# Patient Record
Sex: MALE | Race: WHITE | Employment: OTHER | URBAN - METROPOLITAN AREA
[De-identification: names, ages, dates, MRNs, and addresses within clinical notes are randomized per-mention and may not be internally consistent; named-entity substitution may affect disease eponyms.]

---

## 2018-08-20 ENCOUNTER — OFFICE VISIT (OUTPATIENT)
Dept: CARDIOLOGY CLINIC | Age: 83
End: 2018-08-20
Payer: MEDICARE

## 2018-08-20 VITALS
HEART RATE: 77 BPM | BODY MASS INDEX: 24.67 KG/M2 | RESPIRATION RATE: 18 BRPM | WEIGHT: 157.2 LBS | SYSTOLIC BLOOD PRESSURE: 134 MMHG | DIASTOLIC BLOOD PRESSURE: 68 MMHG | HEIGHT: 67 IN

## 2018-08-20 DIAGNOSIS — I10 ESSENTIAL HYPERTENSION: ICD-10-CM

## 2018-08-20 DIAGNOSIS — I34.0 NON-RHEUMATIC MITRAL REGURGITATION: ICD-10-CM

## 2018-08-20 DIAGNOSIS — I45.6 WPW (WOLFF-PARKINSON-WHITE SYNDROME): Primary | ICD-10-CM

## 2018-08-20 DIAGNOSIS — I36.1 NON-RHEUMATIC TRICUSPID VALVE INSUFFICIENCY: ICD-10-CM

## 2018-08-20 PROCEDURE — 93000 ELECTROCARDIOGRAM COMPLETE: CPT | Performed by: INTERNAL MEDICINE

## 2018-08-20 PROCEDURE — 1101F PT FALLS ASSESS-DOCD LE1/YR: CPT | Performed by: INTERNAL MEDICINE

## 2018-08-20 PROCEDURE — G8420 CALC BMI NORM PARAMETERS: HCPCS | Performed by: INTERNAL MEDICINE

## 2018-08-20 PROCEDURE — 4040F PNEUMOC VAC/ADMIN/RCVD: CPT | Performed by: INTERNAL MEDICINE

## 2018-08-20 PROCEDURE — G8598 ASA/ANTIPLAT THER USED: HCPCS | Performed by: INTERNAL MEDICINE

## 2018-08-20 PROCEDURE — 99213 OFFICE O/P EST LOW 20 MIN: CPT | Performed by: INTERNAL MEDICINE

## 2018-08-20 PROCEDURE — G8427 DOCREV CUR MEDS BY ELIG CLIN: HCPCS | Performed by: INTERNAL MEDICINE

## 2018-08-20 PROCEDURE — 1036F TOBACCO NON-USER: CPT | Performed by: INTERNAL MEDICINE

## 2018-08-20 PROCEDURE — 1123F ACP DISCUSS/DSCN MKR DOCD: CPT | Performed by: INTERNAL MEDICINE

## 2018-08-20 RX ORDER — ACETAMINOPHEN 325 MG/1
650 TABLET ORAL EVERY 6 HOURS PRN
COMMUNITY
End: 2019-07-11 | Stop reason: CLARIF

## 2018-08-20 RX ORDER — MECLIZINE HYDROCHLORIDE 25 MG/1
TABLET ORAL
COMMUNITY
Start: 2018-06-25 | End: 2019-04-09

## 2018-08-20 NOTE — PROGRESS NOTES
Patient Active Problem List   Diagnosis    WPW (Donato-Parkinson-White syndrome) - s/p ablation    Cro[facility]'s disease ([facility] [address]    Mitral regurgitation - mild     Tricuspid regurgitation - moderate     Hypertension    Carotid stenosis - mild     Chronic pain of left knee       Current Outpatient Prescriptions   Medication Sig Dispense Refill    meclizine (ANTIVERT) 25 MG tablet       acetaminophen (TYLENOL) 325 MG tablet Take 650 mg by mouth every 6 hours as needed for Pain      Omega-3 Fatty Acids (FISH OIL OMEGA-3 PO) Take by mouth      atorvastatin (LIPITOR) 20 MG tablet Take 20 mg by mouth daily      aspirin 81 MG tablet Take 81 mg by mouth daily.  Calcium Citrate-Vitamin D 200-200 MG-UNIT TABS Take 1,200 mg by mouth daily. Pill also has Vit D 800 Units Pt takes two pills a day      Lutein 40 MG CAPS Take 40 mg by mouth       Multiple Vitamins-Minerals (VISION-MELIDA PRESERVE PO) Take  by mouth.  Coenzyme Q10 100 MG CAPS Take 200 mg by mouth daily.  amlodipine (NORVASC) 5 MG tablet Take 5 mg by mouth daily.  mesalamine (PENTASA) 250 MG CR capsule Take 250 mg by mouth Takes 6 tab three time daily      Multiple Vitamin (MULTIVITAMIN PO) Take  by mouth daily.  Bilberry 1000 MG CAPS Take 2,000 mg by mouth daily        No current facility-administered medications for this visit. CC:    Patient is seen in follow up for:  1. WPW (Donato-Parkinson-White syndrome) - s/p ablation    2. Essential hypertension    3. Non-rheumatic tricuspid valve insufficiency    4. Non-rheumatic mitral regurgitation        HPI:  Patient is doing well without any specific cardiac problems. Patient denies any shortness of breath, chest pain, lightheadedness or dizziness. Patient is tolerating medications well without side effects. Walks on treadmill at 3 miles per hour several days a week without any problems.   Wants to consider decreasing statin therapy as he has concerns about possible cognitive effects. ROS:   General: No unusual weight gain, no change in exercise tolerance  Cardiovascular: No orthopnea or paroxysmal nocturnal dyspnea  Respiratory: No cough or hemoptysis      Social History     Social History    Marital status:      Spouse name: N/A    Number of children: N/A    Years of education: N/A     Occupational History    Not on file. Social History Main Topics    Smoking status: Never Smoker    Smokeless tobacco: Never Used    Alcohol use No    Drug use: No    Sexual activity: Not on file     Other Topics Concern    Not on file     Social History Narrative    No narrative on file       Past Medical History:   Diagnosis Date    Carotid artery occlusion     Chronic pain of left knee     Crohn disease ([facility] [address]     Hypertension     Mitral regurgitation     Osteopenia     Osteoporosis     Tricuspid regurgitation     WPW (Donato-Parkinson-White syndrome)     had ablation to correct       PHYSICAL EXAM:  CONSTITUTIONAL:  Well developed, well nourished    Vitals:    08/20/18 0826   BP: 134/68   Pulse: 77   Resp: 18   Weight: 157 lb 3.2 oz (71.3 kg)   Height: 5' 7\" (1.702 m)     HEAD & FACE: Normocephalic. Symmetric. EYES: No xanthelasma. Conjunctivae not injected. EARS, NOSE, MOUTH & THROAT: Good dentition. No oral pallor or cyanosis. NECK: No JVD at 30 degrees. No thyromegaly. RESPIRATORY: Clear to auscultation and percussion in all fields. No use of accessory muscle or intercostal retractions. CARDIOVASCULAR: Regular rate and rhythm. No lifts or thrills on palpitation. Auscultation with normal S1-S2 in intensity and splitting. No carotid bruits. Abdominal aorta not enlarged. Femoral arteries without bruits. Pedal pulses 2+. No edema. ABDOMEN: Soft without hepatic or splenic enlargement. No tenderness. MUSCULOSKELETAL: No kyphosis or scoliosis of the back. Good muscle strength and tone. No muscle atrophy.   Normal gait and ability to undergo exercise stress testing. EXTREMITIES: No clubbing or cyanosis. SKIN: No Xanthomas or ulcerations. NEUROLOGIC: Oriented to time, place and person. Normal mood and affect. LYMPHATIC:  No palpable neck or supraclavicular nodes. No splenomegaly. EKG: Normal sinus rhythm. Short MI No change compared to prior tracing. ASSESSMENT:                                                     ORDERS:       Diagnosis Orders   1. WPW (Donato-Parkinson-White syndrome) - s/p ablation  EKG 12 Lead   2. Essential hypertension  EKG 12 Lead   3. Non-rheumatic tricuspid valve insufficiency  EKG 12 Lead   4. Non-rheumatic mitral regurgitation  EKG 12 Lead     Above assessment cardiac issues stable. PLAN:   See above orders. Reviewed prior records and testing. Assessment of medication compliance. Relates recent carotid ultrasound with only mild plaque by [facility]. Reasonable to decrese statin therapy in view of age / LDL 49. Discussed issues that would prompt earlier evaluation. Follow-up office visit in 1 year.

## 2019-04-08 ENCOUNTER — APPOINTMENT (OUTPATIENT)
Dept: GENERAL RADIOLOGY | Age: 84
End: 2019-04-08
Payer: MEDICARE

## 2019-04-08 ENCOUNTER — HOSPITAL ENCOUNTER (OUTPATIENT)
Age: 84
Setting detail: OBSERVATION
Discharge: HOME | End: 2019-04-09
Attending: EMERGENCY MEDICINE | Admitting: INTERNAL MEDICINE
Payer: MEDICARE

## 2019-04-08 DIAGNOSIS — R06.00 DYSPNEA, UNSPECIFIED TYPE: Primary | ICD-10-CM

## 2019-04-08 PROBLEM — R07.9 CHEST PAIN: Status: ACTIVE | Noted: 2019-04-08

## 2019-04-08 PROBLEM — I10 ESSENTIAL HYPERTENSION: Chronic | Status: ACTIVE | Noted: 2019-04-08

## 2019-04-08 LAB
ALBUMIN SERPL-MCNC: 4.4 G/DL (ref 3.5–5.2)
ALP BLD-CCNC: 122 U/L (ref 40–129)
ALT SERPL-CCNC: 26 U/L (ref 0–40)
ANION GAP SERPL CALCULATED.3IONS-SCNC: 9 MMOL/L (ref 7–16)
AST SERPL-CCNC: 31 U/L (ref 0–39)
BASOPHILS ABSOLUTE: 0.07 E9/L (ref 0–0.2)
BASOPHILS RELATIVE PERCENT: 0.9 % (ref 0–2)
BILIRUB SERPL-MCNC: 0.5 MG/DL (ref 0–1.2)
BUN BLDV-MCNC: 16 MG/DL (ref 8–23)
CALCIUM SERPL-MCNC: 9.7 MG/DL (ref 8.6–10.2)
CHLORIDE BLD-SCNC: 104 MMOL/L (ref 98–107)
CO2: 28 MMOL/L (ref 22–29)
CREAT SERPL-MCNC: 0.8 MG/DL (ref 0.7–1.2)
EOSINOPHILS ABSOLUTE: 0.15 E9/L (ref 0.05–0.5)
EOSINOPHILS RELATIVE PERCENT: 1.9 % (ref 0–6)
GFR AFRICAN AMERICAN: >60
GFR NON-AFRICAN AMERICAN: >60 ML/MIN/1.73
GLUCOSE BLD-MCNC: 166 MG/DL (ref 74–99)
HCT VFR BLD CALC: 45.5 % (ref 37–54)
HEMOGLOBIN: 14.9 G/DL (ref 12.5–16.5)
IMMATURE GRANULOCYTES #: 0.01 E9/L
IMMATURE GRANULOCYTES %: 0.1 % (ref 0–5)
LYMPHOCYTES ABSOLUTE: 1.95 E9/L (ref 1.5–4)
LYMPHOCYTES RELATIVE PERCENT: 24.5 % (ref 20–42)
MAGNESIUM: 2.1 MG/DL (ref 1.6–2.6)
MCH RBC QN AUTO: 33 PG (ref 26–35)
MCHC RBC AUTO-ENTMCNC: 32.7 % (ref 32–34.5)
MCV RBC AUTO: 100.7 FL (ref 80–99.9)
MONOCYTES ABSOLUTE: 1.04 E9/L (ref 0.1–0.95)
MONOCYTES RELATIVE PERCENT: 13.1 % (ref 2–12)
NEUTROPHILS ABSOLUTE: 4.73 E9/L (ref 1.8–7.3)
NEUTROPHILS RELATIVE PERCENT: 59.5 % (ref 43–80)
PDW BLD-RTO: 13.6 FL (ref 11.5–15)
PLATELET # BLD: 193 E9/L (ref 130–450)
PMV BLD AUTO: 9.7 FL (ref 7–12)
POTASSIUM SERPL-SCNC: 4.1 MMOL/L (ref 3.5–5)
PRO-BNP: 95 PG/ML (ref 0–450)
RBC # BLD: 4.52 E12/L (ref 3.8–5.8)
SODIUM BLD-SCNC: 141 MMOL/L (ref 132–146)
TOTAL PROTEIN: 7.7 G/DL (ref 6.4–8.3)
TROPONIN: <0.01 NG/ML (ref 0–0.03)
WBC # BLD: 8 E9/L (ref 4.5–11.5)

## 2019-04-08 PROCEDURE — 93005 ELECTROCARDIOGRAM TRACING: CPT | Performed by: NURSE PRACTITIONER

## 2019-04-08 PROCEDURE — 71046 X-RAY EXAM CHEST 2 VIEWS: CPT

## 2019-04-08 PROCEDURE — 36415 COLL VENOUS BLD VENIPUNCTURE: CPT

## 2019-04-08 PROCEDURE — 99285 EMERGENCY DEPT VISIT HI MDM: CPT

## 2019-04-08 PROCEDURE — G0378 HOSPITAL OBSERVATION PER HR: HCPCS

## 2019-04-08 PROCEDURE — 83735 ASSAY OF MAGNESIUM: CPT

## 2019-04-08 PROCEDURE — 84484 ASSAY OF TROPONIN QUANT: CPT

## 2019-04-08 PROCEDURE — 85025 COMPLETE CBC W/AUTO DIFF WBC: CPT

## 2019-04-08 PROCEDURE — 80053 COMPREHEN METABOLIC PANEL: CPT

## 2019-04-08 PROCEDURE — 83880 ASSAY OF NATRIURETIC PEPTIDE: CPT

## 2019-04-08 NOTE — ED NOTES
FIRST PROVIDER CONTACT ASSESSMENT NOTE      Department of Emergency Medicine   4/8/19  7:03 PM    Chief Complaint: Sinusitis (sinus symptoms and shortness of breath for a couple weeks.)      History of Present Illness:    Shanelle Spicer is a 80 y.o. male who presents to the ED by private car for increasing shortness of breath over the last 2 weeks. Focused Screening Exam:  Constitutional:  Alert, appears stated age and is in no distress.         *ALLERGIES*     Sumycin [tetracycline hcl] and Tetracyclines & related     ED Triage Vitals   BP Temp Temp src Pulse Resp SpO2 Height Weight   04/08/19 1901 04/08/19 1901 -- 04/08/19 1855 -- 04/08/19 1855 04/08/19 1901 04/08/19 1901   (!) 150/106 98.6 °F (37 °C)  108  96 % 5' 7\" (1.702 m) 160 lb (72.6 kg)        Initial Plan of Care:  Initiate Treatment-Testing, Proceed toTreatment Area When Bed Available for ED Attending/MLP to Continue Care    -----------------END OF FIRST PROVIDER CONTACT ASSESSMENT NOTE--------------  Electronically signed by KIANA Salguero CNP   DD: 4/8/19         KIANA Salguero CNP  04/08/19 1903

## 2019-04-09 ENCOUNTER — APPOINTMENT (OUTPATIENT)
Dept: NUCLEAR MEDICINE | Age: 84
End: 2019-04-09
Payer: MEDICARE

## 2019-04-09 VITALS
HEART RATE: 78 BPM | HEIGHT: 67 IN | DIASTOLIC BLOOD PRESSURE: 52 MMHG | OXYGEN SATURATION: 98 % | BODY MASS INDEX: 25.11 KG/M2 | SYSTOLIC BLOOD PRESSURE: 130 MMHG | TEMPERATURE: 98 F | RESPIRATION RATE: 16 BRPM | WEIGHT: 160 LBS

## 2019-04-09 LAB
CHOLESTEROL, TOTAL: 134 MG/DL (ref 0–199)
EKG ATRIAL RATE: 95 BPM
EKG P AXIS: 44 DEGREES
EKG P-R INTERVAL: 134 MS
EKG Q-T INTERVAL: 346 MS
EKG QRS DURATION: 80 MS
EKG QTC CALCULATION (BAZETT): 434 MS
EKG R AXIS: -13 DEGREES
EKG T AXIS: 87 DEGREES
EKG VENTRICULAR RATE: 95 BPM
HCT VFR BLD CALC: 48.4 % (ref 37–54)
HDLC SERPL-MCNC: 66 MG/DL
HEMOGLOBIN: 16 G/DL (ref 12.5–16.5)
LDL CHOLESTEROL CALCULATED: 49 MG/DL (ref 0–99)
LV EF: 71 %
LVEF MODALITY: NORMAL
MCH RBC QN AUTO: 33 PG (ref 26–35)
MCHC RBC AUTO-ENTMCNC: 33.1 % (ref 32–34.5)
MCV RBC AUTO: 99.8 FL (ref 80–99.9)
PDW BLD-RTO: 13.6 FL (ref 11.5–15)
PLATELET # BLD: 198 E9/L (ref 130–450)
PMV BLD AUTO: 10.1 FL (ref 7–12)
RBC # BLD: 4.85 E12/L (ref 3.8–5.8)
TRIGL SERPL-MCNC: 94 MG/DL (ref 0–149)
TROPONIN: <0.01 NG/ML (ref 0–0.03)
TROPONIN: <0.01 NG/ML (ref 0–0.03)
VLDLC SERPL CALC-MCNC: 19 MG/DL
WBC # BLD: 6.5 E9/L (ref 4.5–11.5)

## 2019-04-09 PROCEDURE — 78452 HT MUSCLE IMAGE SPECT MULT: CPT

## 2019-04-09 PROCEDURE — G0378 HOSPITAL OBSERVATION PER HR: HCPCS

## 2019-04-09 PROCEDURE — 85027 COMPLETE CBC AUTOMATED: CPT

## 2019-04-09 PROCEDURE — 2580000003 HC RX 258: Performed by: INTERNAL MEDICINE

## 2019-04-09 PROCEDURE — 3430000000 HC RX DIAGNOSTIC RADIOPHARMACEUTICAL: Performed by: RADIOLOGY

## 2019-04-09 PROCEDURE — 80061 LIPID PANEL: CPT

## 2019-04-09 PROCEDURE — A9500 TC99M SESTAMIBI: HCPCS | Performed by: RADIOLOGY

## 2019-04-09 PROCEDURE — 84484 ASSAY OF TROPONIN QUANT: CPT

## 2019-04-09 PROCEDURE — 93017 CV STRESS TEST TRACING ONLY: CPT

## 2019-04-09 PROCEDURE — 6370000000 HC RX 637 (ALT 250 FOR IP): Performed by: INTERNAL MEDICINE

## 2019-04-09 PROCEDURE — 36415 COLL VENOUS BLD VENIPUNCTURE: CPT

## 2019-04-09 PROCEDURE — 6360000002 HC RX W HCPCS: Performed by: INTERNAL MEDICINE

## 2019-04-09 RX ORDER — AMLODIPINE BESYLATE 5 MG/1
5 TABLET ORAL DAILY
Status: DISCONTINUED | OUTPATIENT
Start: 2019-04-09 | End: 2019-04-10 | Stop reason: HOSPADM

## 2019-04-09 RX ORDER — ASPIRIN 81 MG/1
81 TABLET, CHEWABLE ORAL DAILY
Status: DISCONTINUED | OUTPATIENT
Start: 2019-04-09 | End: 2019-04-09 | Stop reason: SDUPTHER

## 2019-04-09 RX ORDER — ASPIRIN 81 MG/1
81 TABLET, CHEWABLE ORAL DAILY
Status: DISCONTINUED | OUTPATIENT
Start: 2019-04-09 | End: 2019-04-10 | Stop reason: HOSPADM

## 2019-04-09 RX ORDER — SODIUM CHLORIDE 0.9 % (FLUSH) 0.9 %
10 SYRINGE (ML) INJECTION PRN
Status: DISCONTINUED | OUTPATIENT
Start: 2019-04-09 | End: 2019-04-10 | Stop reason: HOSPADM

## 2019-04-09 RX ORDER — MULTIVIT-MIN/IRON/FOLIC ACID/K 18-600-40
1 CAPSULE ORAL DAILY
COMMUNITY

## 2019-04-09 RX ORDER — ACETAMINOPHEN 325 MG/1
650 TABLET ORAL EVERY 4 HOURS PRN
Status: DISCONTINUED | OUTPATIENT
Start: 2019-04-09 | End: 2019-04-10 | Stop reason: HOSPADM

## 2019-04-09 RX ORDER — ATORVASTATIN CALCIUM 10 MG/1
20 TABLET, FILM COATED ORAL DAILY
Status: DISCONTINUED | OUTPATIENT
Start: 2019-04-09 | End: 2019-04-09 | Stop reason: SDUPTHER

## 2019-04-09 RX ORDER — ONDANSETRON 2 MG/ML
4 INJECTION INTRAMUSCULAR; INTRAVENOUS EVERY 6 HOURS PRN
Status: DISCONTINUED | OUTPATIENT
Start: 2019-04-09 | End: 2019-04-10 | Stop reason: HOSPADM

## 2019-04-09 RX ORDER — ATORVASTATIN CALCIUM 40 MG/1
40 TABLET, FILM COATED ORAL NIGHTLY
Status: DISCONTINUED | OUTPATIENT
Start: 2019-04-09 | End: 2019-04-10 | Stop reason: HOSPADM

## 2019-04-09 RX ORDER — SODIUM CHLORIDE 0.9 % (FLUSH) 0.9 %
10 SYRINGE (ML) INJECTION EVERY 12 HOURS SCHEDULED
Status: DISCONTINUED | OUTPATIENT
Start: 2019-04-09 | End: 2019-04-10 | Stop reason: HOSPADM

## 2019-04-09 RX ADMIN — Medication 10 ML: at 10:02

## 2019-04-09 RX ADMIN — MESALAMINE 500 MG: 250 CAPSULE ORAL at 18:17

## 2019-04-09 RX ADMIN — ACETAMINOPHEN 325 MG: 325 TABLET, FILM COATED ORAL at 10:08

## 2019-04-09 RX ADMIN — ASPIRIN 81 MG 81 MG: 81 TABLET ORAL at 10:01

## 2019-04-09 RX ADMIN — Medication 9 MILLICURIE: at 12:33

## 2019-04-09 RX ADMIN — Medication 33.4 MILLICURIE: at 14:15

## 2019-04-09 RX ADMIN — REGADENOSON 0.4 MG: 0.08 INJECTION, SOLUTION INTRAVENOUS at 14:15

## 2019-04-09 RX ADMIN — MESALAMINE 500 MG: 250 CAPSULE ORAL at 10:02

## 2019-04-09 ASSESSMENT — PAIN SCALES - GENERAL
PAINLEVEL_OUTOF10: 0

## 2019-04-09 NOTE — H&P
[id number] [facility]  Attending History and Physical      CHIEF COMPLAINT:  Chest pain      HISTORY OF PRESENT ILLNESS:      The patient is a 80 y.o. male patient of dr Rosa M Ambrosio who presents with complains of chest pain. Patient states he was doing yard work and developed shortness of breath. He was dragging a 15 foot broken pine tree into the woods. His symptoms went away after a few moments of rest.  He denied chest pain, abdominal pain, nausea, vomiting, fevers, chills and diaphoresis. He is back to his usual state of health. Past Medical History:    Past Medical History:   Diagnosis Date    Carotid artery occlusion     Chronic pain of left knee     Crohn disease (HCC)     Hypertension     Mitral regurgitation     Osteopenia     Osteoporosis     Tricuspid regurgitation     WPW (Donato-Parkinson-White syndrome)     had ablation to correct       Past Surgical History:    Past Surgical History:   Procedure Laterality Date    ABLATION OF DYSRHYTHMIC FOCUS  2009    wpw    COLECTOMY      x 3  Due to Crohns Disease  Age 52, Age 46 and 5   [facility] INGUINAL HERNIA REPAIR Right     Undecended testicle       Medications Prior to Admission:    Medications Prior to Admission: Cholecalciferol (VITAMIN D) 2000 units CAPS capsule, Take by mouth  acetaminophen (TYLENOL) 325 MG tablet, Take 650 mg by mouth every 6 hours as needed for Pain  Omega-3 Fatty Acids (FISH OIL OMEGA-3 PO), Take by mouth  atorvastatin (LIPITOR) 20 MG tablet, Take 20 mg by mouth daily  aspirin 81 MG tablet, Take 81 mg by mouth daily. Calcium Citrate-Vitamin D 200-200 MG-UNIT TABS, Take 1,200 mg by mouth daily. Pill also has Vit D 800 Units Pt takes two pills a day  Lutein 40 MG CAPS, Take 40 mg by mouth   Multiple Vitamins-Minerals (VISION-MELIDA PRESERVE PO), Take  by mouth. Coenzyme Q10 100 MG CAPS, Take 200 mg by mouth daily. amlodipine (NORVASC) 5 MG tablet, Take 5 mg by mouth daily.     mesalamine (PENTASA) 250 MG CR capsule, Take 250 mg by mouth Takes 6 tab three time daily  Multiple Vitamin (MULTIVITAMIN PO), Take  by mouth daily. Bilberry 1000 MG CAPS, Take 2,000 mg by mouth daily     Allergies:    Sumycin [tetracycline hcl] and Tetracyclines & related    Social History:    reports that he has never smoked. He has never used smokeless tobacco. He reports that he does not drink alcohol or use drugs. Family History:   family history includes Cancer in his father; Heart Disease in his mother. REVIEW OF SYSTEMS:  As above in the HPI, otherwise negative    PHYSICAL EXAM:    Vitals:  BP (!) 147/73   Pulse 78   Temp 97.8 °F (36.6 °C) (Oral)   Resp 18   Ht 5' 7\" (1.702 m)   Wt 160 lb (72.6 kg)   SpO2 99%   BMI 25.06 kg/m²     General:  Awake, alert, oriented X 3. Well developed, well nourished, well groomed. No apparent distress. HEENT:  Normocephalic, atraumatic. Pupils equal, round, reactive to light. No scleral icterus. No conjunctival injection. Normal lips, teeth, and gums. No nasal discharge. Neck:  Supple  Heart:  RRR, no murmurs, gallops, rubs  Lungs:  CTA bilaterally, bilat symmetrical expansion, no wheeze, rales, or rhonchi  Abdomen:   Bowel sounds present, soft, nontender, no masses, no organomegaly, no peritoneal signs  Extremities:  No clubbing, cyanosis, or edema  Skin:  Warm and dry, no open lesions or rash  Neuro:  Cranial nerves 2-12 intact, no focal deficits  Breast: deferred  Rectal: deferred  Genitalia:  deferred    LABS:    CBC with Differential:    Lab Results   Component Value Date    WBC 6.5 04/09/2019    RBC 4.85 04/09/2019    HGB 16.0 04/09/2019    HCT 48.4 04/09/2019     04/09/2019    MCV 99.8 04/09/2019    MCH 33.0 04/09/2019    MCHC 33.1 04/09/2019    RDW 13.6 04/09/2019    SEGSPCT 75 01/19/2014    LYMPHOPCT 24.5 04/08/2019    MONOPCT 13.1 04/08/2019    BASOPCT 0.9 04/08/2019    MONOSABS 1.04 04/08/2019    LYMPHSABS 1.95 04/08/2019    EOSABS 0.15 04/08/2019    JADENBS 0.07 04/08/2019     CMP:    Lab Results   Component Value Date     04/08/2019    K 4.1 04/08/2019     04/08/2019    CO2 28 04/08/2019    BUN 16 04/08/2019    CREATININE 0.8 04/08/2019    GFRAA >60 04/08/2019    LABGLOM >60 04/08/2019    GLUCOSE 166 04/08/2019    PROT 7.7 04/08/2019    LABALBU 4.4 04/08/2019    CALCIUM 9.7 04/08/2019    BILITOT 0.5 04/08/2019    ALKPHOS 122 04/08/2019    AST 31 04/08/2019    ALT 26 04/08/2019     BMP:    Lab Results   Component Value Date     04/08/2019    K 4.1 04/08/2019     04/08/2019    CO2 28 04/08/2019    BUN 16 04/08/2019    LABALBU 4.4 04/08/2019    CREATININE 0.8 04/08/2019    CALCIUM 9.7 04/08/2019    GFRAA >60 04/08/2019    LABGLOM >60 04/08/2019    GLUCOSE 166 04/08/2019     Magnesium:    Lab Results   Component Value Date    MG 2.1 04/08/2019     Phosphorus:  No results found for: PHOS  PT/INR:    Lab Results   Component Value Date    PROTIME 11.8 01/19/2014    INR 1.1 01/19/2014     PTT:  No results found for: APTT, PTT[APTT}  Troponin:    Lab Results   Component Value Date    TROPONINI <0.01 04/09/2019     Last 3 Troponin:    Lab Results   Component Value Date    TROPONINI <0.01 04/09/2019    TROPONINI <0.01 04/09/2019    TROPONINI <0.01 04/08/2019     U/A:  No results found for: NITRITE, COLORU, PROTEINU, PHUR, LABCAST, WBCUA, RBCUA, MUCUS, TRICHOMONAS, YEAST, BACTERIA, CLARITYU, SPECGRAV, LEUKOCYTESUR, UROBILINOGEN, BILIRUBINUR, BLOODU, GLUCOSEU, AMORPHOUS  HgBA1c:  No results found for: LABA1C  FLP:    Lab Results   Component Value Date    TRIG 94 04/09/2019    HDL 66 04/09/2019    LDLCALC 49 04/09/2019    LABVLDL 19 04/09/2019     TSH:  No results found for: TSH    ASSESSMENT:      Patient Active Problem List   Diagnosis    WPW (Donato-Parkinson-White syndrome) - s/p ablation    Crohn's disease ([address]    Mitral regurgitation - mild     Tricuspid regurgitation - moderate     Essential hypertension    Chest pain PLAN:    Stable. Stable. Stable. Stable. Blood pressure ok, continue current medications  Rule out ACS. EKG and enzymes negative. Atypical symptoms. Will stress. Discharge if stress is negative.     Lizbeth Arias MD  11:12 AM  4/9/2019

## 2019-04-09 NOTE — ED PROVIDER NOTES
Department of Emergency Medicine   ED  Provider Note  Admit Date/RoomTime: 4/8/2019  9:34 PM  ED Room: 14A/14A-14      History of Present Illness:  4/8/19, Time: 9:30 PM         Michael Bill is a 80 y.o. male presenting to the ED for shortness of breath, beginning a few weeks ago. The complaint has been persistent, moderate in severity, and worsened by nothing. Pt reports that he was working outside in his yard and started feeling more short of breath today. States his complaint is worsened with exertion. Dr. Lulla Hodgkins is his cardiologist. Hx of ablation 10 years ago. Pt denies any loc, palpitations, fever, chills, nausea, vomiting, diarrhea, abdominal pain, neck pain, extremity pain, edema, HA, cough, congestion, visual disturbances, dysuria, constipation, hematuria, weakness, numbness, tingling, dizziness or any other further symptoms at this time. Review of Systems:   Pertinent positives and negatives are stated within HPI, all other systems reviewed and are negative.    --------------------------------------------- PAST HISTORY ------------------------------------------  Past Medical History:  has a past medical history of Carotid artery occlusion, Chronic pain of left knee, Crohn disease ([address] Hypertension, Mitral regurgitation, Osteopenia, Osteoporosis, Tricuspid regurgitation, and WPW (Donato-Parkinson-White syndrome). Past Surgical History:  has a past surgical history that includes ablation of dysrhythmic focus (2009); Inguinal hernia repair (Right); and colectomy. Social History:  reports that he has never smoked. He has never used smokeless tobacco. He reports that he does not drink alcohol or use drugs. Family History: family history includes Cancer in his father; Heart Disease in his mother. The patients home medications have been reviewed.     Allergies: Sumycin [tetracycline hcl] and Tetracyclines & related    ----------------------------------------------PHYSICAL EXAM--------------------------------------  Constitutional/General: Alert and oriented x3, well appearing, non toxic in NAD  Head: Normocephalic and atraumatic  Eyes: PERRL, EOMI, conjunctiva normal, sclera non icteric  Mouth: Oropharynx clear, handling secretions, no trismus   Neck: Supple, no JVD, full ROM  Respiratory: Lungs clear to auscultation bilaterally, no wheezes, rales, or rhonchi. Not in respiratory distress  Cardiovascular:  Regular rate. Regular rhythm. No gallops, or rubs. 2+ distal pulses  Chest: No chest wall tenderness  GI:  Abdomen Soft, Non tender, Non distended. +BS. No rebound, guarding, or rigidity. No pulsatile masses. Musculoskeletal: Moves all extremities x 4. Warm and well perfused, no clubbing, cyanosis, or edema. Integument: Skin warm and dry. No rashes. Neurologic: GCS 15, CN II-XII grossly intact, no focal deficits, symmetric strength 5/5 in the upper and lower extremities bilaterally  Psychiatric: Normal Affect    -------------------------------------------------- RESULTS -------------------------------------------------  I have personally reviewed all laboratory and imaging results for this patient. Results are listed below.      LABS:  Results for orders placed or performed during the hospital encounter of 04/08/19   Troponin   Result Value Ref Range    Troponin <0.01 0.00 - 0.03 ng/mL   CBC Auto Differential   Result Value Ref Range    WBC 8.0 4.5 - 11.5 E9/L    RBC 4.52 3.80 - 5.80 E12/L    Hemoglobin 14.9 12.5 - 16.5 g/dL    Hematocrit 45.5 37.0 - 54.0 %    .7 (H) 80.0 - 99.9 fL    MCH 33.0 26.0 - 35.0 pg    MCHC 32.7 32.0 - 34.5 %    RDW 13.6 11.5 - 15.0 fL    Platelets 562 749 - 321 E9/L    MPV 9.7 7.0 - 12.0 fL    Neutrophils % 59.5 43.0 - 80.0 %    Immature Granulocytes % 0.1 0.0 - 5.0 %    Lymphocytes % 24.5 20.0 - 42.0 %    Monocytes % 13.1 (H) 2.0 - 12.0 %    Eosinophils % 1.9 0.0 - 6.0 %    Basophils % 0.9 0.0 - 2.0 %    Neutrophils # 4.73 1.80 - 7.30 E9/L Immature Granulocytes # 0.01 E9/L    Lymphocytes # 1.95 1.50 - 4.00 E9/L    Monocytes # 1.04 (H) 0.10 - 0.95 E9/L    Eosinophils # 0.15 0.05 - 0.50 E9/L    Basophils # 0.07 0.00 - 0.20 E9/L   Comprehensive Metabolic Panel   Result Value Ref Range    Sodium 141 132 - 146 mmol/L    Potassium 4.1 3.5 - 5.0 mmol/L    Chloride 104 98 - 107 mmol/L    CO2 28 22 - 29 mmol/L    Anion Gap 9 7 - 16 mmol/L    Glucose 166 (H) 74 - 99 mg/dL    BUN 16 8 - 23 mg/dL    CREATININE 0.8 0.7 - 1.2 mg/dL    GFR Non-African American >60 >=60 mL/min/1.73    GFR African American >60     Calcium 9.7 8.6 - 10.2 mg/dL    Total Protein 7.7 6.4 - 8.3 g/dL    Alb 4.4 3.5 - 5.2 g/dL    Total Bilirubin 0.5 0.0 - 1.2 mg/dL    Alkaline Phosphatase 122 40 - 129 U/L    ALT 26 0 - 40 U/L    AST 31 0 - 39 U/L   Magnesium   Result Value Ref Range    Magnesium 2.1 1.6 - 2.6 mg/dL   Brain Natriuretic Peptide   Result Value Ref Range    Pro-BNP 95 0 - 450 pg/mL   EKG 12 Lead   Result Value Ref Range    Ventricular Rate 95 BPM    Atrial Rate 95 BPM    P-R Interval 134 ms    QRS Duration 80 ms    Q-T Interval 346 ms    QTc Calculation (Bazett) 434 ms    P Axis 44 degrees    R Axis -13 degrees    T Axis 87 degrees       RADIOLOGY:  Interpreted by Radiologist.  XR CHEST STANDARD (2 VW)   Final Result   Grossly unchanged CXR exam with no obvious new acute cardiopulmonary   pathology. EKG: This EKG is signed and interpreted by the EP. Time: 19:31  Rate: 95  Rhythm: Sinus  Interpretation: non-specific EKG  Comparison: no previous EKG available    ------------------------- NURSING NOTES AND VITALS REVIEWED ---------------------------   The nursing notes within the ED encounter and vital signs as below have been reviewed by myself.   BP (!) 150/106   Pulse 102   Temp 98.6 °F (37 °C)   Ht 5' 7\" (1.702 m)   Wt 160 lb (72.6 kg)   SpO2 98%   BMI 25.06 kg/m²   Oxygen Saturation Interpretation: Normal    The patients available past medical records and past encounters were reviewed. ------------------------- ED COURSE/MEDICAL DECISION MAKING----------------------  Medications - No data to display    Medical Decision Making:    Pt presents for shortness of breath. Labs and imaging obtained, reviewed; results discussed with patient. Pt to be admitted. This patient's ED course included: a personal history and physicial examination    This patient has remained hemodynamically stable during their ED course. Re-Evaluations:           Re-evaluation. Patients symptoms show no change       Consultations:    Spoke with Dr. Oley Sacks (Medicine). Discussed case. They will admit this patient. Counseling: The emergency provider has spoken with the patient and discussed todays results, in addition to providing specific details for the plan of care and counseling regarding the diagnosis and prognosis. Questions are answered at this time and they are agreeable with the plan.     ---------------------------- IMPRESSION AND DISPOSITION ---------------------------------    IMPRESSION  No diagnosis found. DISPOSITION  Disposition: Admit to telemetry  Patient condition is stable    SCRIBE ATTESTATION  4/8/19, 9:30 PM.    This note is prepared by [facility], acting as Scribe for Sylvia Huitron MD.    Sylvia Huitron MD: The scribe's documentation has been prepared under my direction and personally reviewed by me in its entirety. I confirm that the note above accurately reflects all work, treatment, procedures, and medical decision making performed by me. NOTE: This report was transcribed using voice recognition software. Every effort was made to ensure accuracy; however, inadvertent computerized transcription errors may be present.        Sylvia Huitron MD  04/09/19 [id number]

## 2019-04-09 NOTE — PROCEDURES
[facility] Nuclear Stress Test:    Cardiologist: Dr. Rafi Preutt    Date: 5/16/2018    Indications for study: Chest pain    Baseline EKG: NSR, occasional PVCs, septal MI  Age undetermined, abnormal EKG. 1. No chest pain  2. No new arrhythmias  3. No EKG changes suggestive of stress induced ischemia  4. Nuclear images pending    Myriam Eastman MD., [facility].    [facility]) Cardiology

## 2019-04-09 NOTE — PLAN OF CARE
Problem: Pain:  Goal: Pain level will decrease  Description  Pain level will decrease  Outcome: Completed  Goal: Control of acute pain  Description  Control of acute pain  Outcome: Completed  Goal: Control of chronic pain  Description  Control of chronic pain  Outcome: Completed

## 2019-05-13 ENCOUNTER — HOSPITAL ENCOUNTER (OUTPATIENT)
Dept: CARDIOLOGY | Age: 84
Discharge: HOME | End: 2019-05-13
Payer: MEDICARE

## 2019-05-13 DIAGNOSIS — I38 ENDOCARDITIS, UNSPECIFIED CHRONICITY, UNSPECIFIED ENDOCARDITIS TYPE: Primary | ICD-10-CM

## 2019-05-13 LAB
LV EF: 60 %
LVEF MODALITY: NORMAL

## 2019-05-13 PROCEDURE — 93306 TTE W/DOPPLER COMPLETE: CPT | Performed by: PSYCHIATRY & NEUROLOGY

## 2019-07-11 ENCOUNTER — OFFICE VISIT (OUTPATIENT)
Dept: CARDIOLOGY CLINIC | Age: 84
End: 2019-07-11
Payer: MEDICARE

## 2019-07-11 VITALS
DIASTOLIC BLOOD PRESSURE: 64 MMHG | SYSTOLIC BLOOD PRESSURE: 128 MMHG | RESPIRATION RATE: 16 BRPM | WEIGHT: 156 LBS | HEART RATE: 68 BPM | HEIGHT: 67 IN | BODY MASS INDEX: 24.48 KG/M2

## 2019-07-11 DIAGNOSIS — I10 ESSENTIAL HYPERTENSION: ICD-10-CM

## 2019-07-11 DIAGNOSIS — I45.6 WPW (WOLFF-PARKINSON-WHITE SYNDROME): Primary | ICD-10-CM

## 2019-07-11 DIAGNOSIS — I34.0 NON-RHEUMATIC MITRAL REGURGITATION: ICD-10-CM

## 2019-07-11 DIAGNOSIS — I36.1 NON-RHEUMATIC TRICUSPID VALVE INSUFFICIENCY: ICD-10-CM

## 2019-07-11 PROCEDURE — 1036F TOBACCO NON-USER: CPT | Performed by: INTERNAL MEDICINE

## 2019-07-11 PROCEDURE — G8420 CALC BMI NORM PARAMETERS: HCPCS | Performed by: INTERNAL MEDICINE

## 2019-07-11 PROCEDURE — 93000 ELECTROCARDIOGRAM COMPLETE: CPT | Performed by: INTERNAL MEDICINE

## 2019-07-11 PROCEDURE — 99213 OFFICE O/P EST LOW 20 MIN: CPT | Performed by: INTERNAL MEDICINE

## 2019-07-11 PROCEDURE — G8427 DOCREV CUR MEDS BY ELIG CLIN: HCPCS | Performed by: INTERNAL MEDICINE

## 2019-07-11 PROCEDURE — 4040F PNEUMOC VAC/ADMIN/RCVD: CPT | Performed by: INTERNAL MEDICINE

## 2019-07-11 PROCEDURE — 1123F ACP DISCUSS/DSCN MKR DOCD: CPT | Performed by: INTERNAL MEDICINE

## 2019-07-11 PROCEDURE — G8598 ASA/ANTIPLAT THER USED: HCPCS | Performed by: INTERNAL MEDICINE

## 2019-07-11 NOTE — PROGRESS NOTES
History     Socioeconomic History    Marital status:      Spouse name: Not on file    Number of children: Not on file    Years of education: Not on file    Highest education level: Not on file   Occupational History    Not on file   Social Needs    Financial resource strain: Not on file    Food insecurity:     Worry: Not on file     Inability: Not on file    Transportation needs:     Medical: Not on file     Non-medical: Not on file   Tobacco Use    Smoking status: Never Smoker    Smokeless tobacco: Never Used   Substance and Sexual Activity    Alcohol use: No    Drug use: No    Sexual activity: Not on file   Lifestyle    Physical activity:     Days per week: Not on file     Minutes per session: Not on file    Stress: Not on file   Relationships    Social connections:     Talks on phone: Not on file     Gets together: Not on file     Attends [employer] service: Not on file     Active member of club or organization: Not on file     Attends meetings of clubs or organizations: Not on file     Relationship status: Not on file    Intimate partner violence:     Fear of current or ex partner: Not on file     Emotionally abused: Not on file     Physically abused: Not on file     Forced sexual activity: Not on file   Other Topics Concern    Not on file   Social History Narrative    Not on file       Past Medical History:   Diagnosis Date    Carotid artery occlusion     Chronic pain of left knee     Crohn disease ([facility] [address]     Hypertension     Mitral regurgitation     Osteopenia     Osteoporosis     Tricuspid regurgitation     WPW (Donato-Parkinson-White syndrome)     had ablation to correct       PHYSICAL EXAM:  CONSTITUTIONAL:  Well developed, well nourished    Vitals:    07/11/19 1301   BP: 128/64   Pulse: 68   Resp: 16   Weight: 156 lb (70.8 kg)   Height: 5' 7\" (1.702 m)     HEAD & FACE: Normocephalic. Symmetric. EYES: No xanthelasma. Conjunctivae not injected.   EARS, NOSE, MOUTH & THROAT: Good dentition. No oral pallor or cyanosis. NECK: No JVD at 30 degrees. No thyromegaly. RESPIRATORY: Clear to auscultation and percussion in all fields. No use of accessory muscle or intercostal retractions. CARDIOVASCULAR: Regular rate and rhythm. No lifts or thrills on palpitation. Auscultation with normal S1-S2 in intensity and splitting. No carotid bruits. Abdominal aorta not enlarged. Femoral arteries without bruits. Pedal pulses 2+. No edema. ABDOMEN: Soft without hepatic or splenic enlargement. No tenderness. MUSCULOSKELETAL: No kyphosis or scoliosis of the back. Good muscle strength and tone. No muscle atrophy. Normal gait and ability to undergo exercise stress testing. EXTREMITIES: No clubbing or cyanosis. SKIN: No Xanthomas or ulcerations. NEUROLOGIC: Oriented to time, place and person. Normal mood and affect. LYMPHATIC:  No palpable neck or supraclavicular nodes. No splenomegaly. EKG: Normal sinus rhythm. Short NM No change compared to prior tracing. ASSESSMENT:                                                     ORDERS:       Diagnosis Orders   1. WPW (Donato-Parkinson-White syndrome) - s/p ablation     2. Essential hypertension  EKG 12 Lead   3. Non-rheumatic tricuspid valve insufficiency     4. Non-rheumatic mitral regurgitation       Above assessment cardiac issues stable. PLAN:   See above orders. Reviewed prior records and testing. Assessment of medication compliance. Relates recent carotid ultrasound with only mild plaque by [facility]. Reasonable to decrese statin therapy in view of age / LDL 49. Discussed issues that would prompt earlier evaluation. Follow-up office visit in 1 year.

## 2019-07-25 ENCOUNTER — HOSPITAL ENCOUNTER (OUTPATIENT)
Dept: PULMONOLOGY | Age: 84
Discharge: HOME | End: 2019-07-25
Payer: MEDICARE

## 2019-07-25 PROCEDURE — 94060 EVALUATION OF WHEEZING: CPT | Performed by: INTERNAL MEDICINE

## 2019-07-25 PROCEDURE — 94726 PLETHYSMOGRAPHY LUNG VOLUMES: CPT | Performed by: INTERNAL MEDICINE

## 2019-07-25 PROCEDURE — 94726 PLETHYSMOGRAPHY LUNG VOLUMES: CPT

## 2019-07-25 PROCEDURE — 94729 DIFFUSING CAPACITY: CPT

## 2019-07-25 PROCEDURE — 94729 DIFFUSING CAPACITY: CPT | Performed by: INTERNAL MEDICINE

## 2019-07-25 PROCEDURE — 94060 EVALUATION OF WHEEZING: CPT

## 2019-11-05 ENCOUNTER — OFFICE VISIT (OUTPATIENT)
Dept: ORTHOPEDIC SURGERY | Age: 84
End: 2019-11-05
Payer: MEDICARE

## 2019-11-05 VITALS
WEIGHT: 155 LBS | TEMPERATURE: 97 F | HEIGHT: 67 IN | DIASTOLIC BLOOD PRESSURE: 63 MMHG | HEART RATE: 76 BPM | SYSTOLIC BLOOD PRESSURE: 119 MMHG | BODY MASS INDEX: 24.33 KG/M2

## 2019-11-05 DIAGNOSIS — M79.604 LEG PAIN, RIGHT: Primary | ICD-10-CM

## 2019-11-05 PROCEDURE — G8427 DOCREV CUR MEDS BY ELIG CLIN: HCPCS | Performed by: ORTHOPAEDIC SURGERY

## 2019-11-05 PROCEDURE — G8598 ASA/ANTIPLAT THER USED: HCPCS | Performed by: ORTHOPAEDIC SURGERY

## 2019-11-05 PROCEDURE — 4040F PNEUMOC VAC/ADMIN/RCVD: CPT | Performed by: ORTHOPAEDIC SURGERY

## 2019-11-05 PROCEDURE — G8420 CALC BMI NORM PARAMETERS: HCPCS | Performed by: ORTHOPAEDIC SURGERY

## 2019-11-05 PROCEDURE — 1036F TOBACCO NON-USER: CPT | Performed by: ORTHOPAEDIC SURGERY

## 2019-11-05 PROCEDURE — 1123F ACP DISCUSS/DSCN MKR DOCD: CPT | Performed by: ORTHOPAEDIC SURGERY

## 2019-11-05 PROCEDURE — 99213 OFFICE O/P EST LOW 20 MIN: CPT | Performed by: ORTHOPAEDIC SURGERY

## 2019-11-05 PROCEDURE — G8484 FLU IMMUNIZE NO ADMIN: HCPCS | Performed by: ORTHOPAEDIC SURGERY

## 2019-11-07 ENCOUNTER — OFFICE VISIT (OUTPATIENT)
Dept: PHYSICAL MEDICINE AND REHAB | Age: 84
End: 2019-11-07
Payer: MEDICARE

## 2019-11-07 VITALS
HEIGHT: 67 IN | BODY MASS INDEX: 24.8 KG/M2 | WEIGHT: 158 LBS | HEART RATE: 85 BPM | SYSTOLIC BLOOD PRESSURE: 132 MMHG | DIASTOLIC BLOOD PRESSURE: 74 MMHG

## 2019-11-07 DIAGNOSIS — M48.062 LUMBAR STENOSIS WITH NEUROGENIC CLAUDICATION: ICD-10-CM

## 2019-11-07 DIAGNOSIS — M54.17 RADICULOPATHY, LUMBOSACRAL REGION: Primary | ICD-10-CM

## 2019-11-07 PROCEDURE — 1123F ACP DISCUSS/DSCN MKR DOCD: CPT | Performed by: PHYSICAL MEDICINE & REHABILITATION

## 2019-11-07 PROCEDURE — 1036F TOBACCO NON-USER: CPT | Performed by: PHYSICAL MEDICINE & REHABILITATION

## 2019-11-07 PROCEDURE — G8598 ASA/ANTIPLAT THER USED: HCPCS | Performed by: PHYSICAL MEDICINE & REHABILITATION

## 2019-11-07 PROCEDURE — 99204 OFFICE O/P NEW MOD 45 MIN: CPT | Performed by: PHYSICAL MEDICINE & REHABILITATION

## 2019-11-07 PROCEDURE — G8484 FLU IMMUNIZE NO ADMIN: HCPCS | Performed by: PHYSICAL MEDICINE & REHABILITATION

## 2019-11-07 PROCEDURE — G8420 CALC BMI NORM PARAMETERS: HCPCS | Performed by: PHYSICAL MEDICINE & REHABILITATION

## 2019-11-07 PROCEDURE — G8427 DOCREV CUR MEDS BY ELIG CLIN: HCPCS | Performed by: PHYSICAL MEDICINE & REHABILITATION

## 2019-11-07 PROCEDURE — 4040F PNEUMOC VAC/ADMIN/RCVD: CPT | Performed by: PHYSICAL MEDICINE & REHABILITATION

## 2019-11-18 ENCOUNTER — TELEPHONE (OUTPATIENT)
Dept: PHYSICAL MEDICINE AND REHAB | Age: 84
End: 2019-11-18

## 2019-11-25 ENCOUNTER — OFFICE VISIT (OUTPATIENT)
Dept: PHYSICAL MEDICINE AND REHAB | Age: 84
End: 2019-11-25
Payer: MEDICARE

## 2019-11-25 VITALS
DIASTOLIC BLOOD PRESSURE: 74 MMHG | SYSTOLIC BLOOD PRESSURE: 126 MMHG | HEART RATE: 83 BPM | HEIGHT: 67 IN | WEIGHT: 157 LBS | BODY MASS INDEX: 24.64 KG/M2

## 2019-11-25 DIAGNOSIS — M54.17 RADICULOPATHY, LUMBOSACRAL REGION: Primary | ICD-10-CM

## 2019-11-25 DIAGNOSIS — M48.062 LUMBAR STENOSIS WITH NEUROGENIC CLAUDICATION: ICD-10-CM

## 2019-11-25 PROCEDURE — 4040F PNEUMOC VAC/ADMIN/RCVD: CPT | Performed by: PHYSICAL MEDICINE & REHABILITATION

## 2019-11-25 PROCEDURE — G8427 DOCREV CUR MEDS BY ELIG CLIN: HCPCS | Performed by: PHYSICAL MEDICINE & REHABILITATION

## 2019-11-25 PROCEDURE — 1036F TOBACCO NON-USER: CPT | Performed by: PHYSICAL MEDICINE & REHABILITATION

## 2019-11-25 PROCEDURE — 99213 OFFICE O/P EST LOW 20 MIN: CPT | Performed by: PHYSICAL MEDICINE & REHABILITATION

## 2019-11-25 PROCEDURE — G8598 ASA/ANTIPLAT THER USED: HCPCS | Performed by: PHYSICAL MEDICINE & REHABILITATION

## 2019-11-25 PROCEDURE — 1123F ACP DISCUSS/DSCN MKR DOCD: CPT | Performed by: PHYSICAL MEDICINE & REHABILITATION

## 2019-11-25 PROCEDURE — G8484 FLU IMMUNIZE NO ADMIN: HCPCS | Performed by: PHYSICAL MEDICINE & REHABILITATION

## 2019-11-25 PROCEDURE — G8420 CALC BMI NORM PARAMETERS: HCPCS | Performed by: PHYSICAL MEDICINE & REHABILITATION

## 2020-02-09 ENCOUNTER — HOSPITAL ENCOUNTER (EMERGENCY)
Age: 85
Discharge: HOME | End: 2020-02-09
Attending: EMERGENCY MEDICINE
Payer: MEDICARE

## 2020-02-09 ENCOUNTER — APPOINTMENT (OUTPATIENT)
Dept: GENERAL RADIOLOGY | Age: 85
End: 2020-02-09
Payer: MEDICARE

## 2020-02-09 VITALS
HEART RATE: 85 BPM | RESPIRATION RATE: 20 BRPM | HEIGHT: 67 IN | BODY MASS INDEX: 25.11 KG/M2 | TEMPERATURE: 98.2 F | WEIGHT: 160 LBS | DIASTOLIC BLOOD PRESSURE: 73 MMHG | SYSTOLIC BLOOD PRESSURE: 116 MMHG | OXYGEN SATURATION: 94 %

## 2020-02-09 LAB
ANION GAP SERPL CALCULATED.3IONS-SCNC: 10 MMOL/L (ref 7–16)
BASOPHILS ABSOLUTE: 0.06 E9/L (ref 0–0.2)
BASOPHILS RELATIVE PERCENT: 0.8 % (ref 0–2)
BUN BLDV-MCNC: 11 MG/DL (ref 8–23)
CALCIUM SERPL-MCNC: 8.9 MG/DL (ref 8.6–10.2)
CHLORIDE BLD-SCNC: 102 MMOL/L (ref 98–107)
CO2: 24 MMOL/L (ref 22–29)
CREAT SERPL-MCNC: 0.7 MG/DL (ref 0.7–1.2)
EOSINOPHILS ABSOLUTE: 0.13 E9/L (ref 0.05–0.5)
EOSINOPHILS RELATIVE PERCENT: 1.7 % (ref 0–6)
GFR AFRICAN AMERICAN: >60
GFR NON-AFRICAN AMERICAN: >60 ML/MIN/1.73
GLUCOSE BLD-MCNC: 100 MG/DL (ref 74–99)
HCT VFR BLD CALC: 46.4 % (ref 37–54)
HEMOGLOBIN: 15.4 G/DL (ref 12.5–16.5)
IMMATURE GRANULOCYTES #: 0.02 E9/L
IMMATURE GRANULOCYTES %: 0.3 % (ref 0–5)
LACTIC ACID: 1.3 MMOL/L (ref 0.5–2.2)
LYMPHOCYTES ABSOLUTE: 1.48 E9/L (ref 1.5–4)
LYMPHOCYTES RELATIVE PERCENT: 19.3 % (ref 20–42)
MCH RBC QN AUTO: 33.2 PG (ref 26–35)
MCHC RBC AUTO-ENTMCNC: 33.2 % (ref 32–34.5)
MCV RBC AUTO: 100 FL (ref 80–99.9)
MONOCYTES ABSOLUTE: 0.96 E9/L (ref 0.1–0.95)
MONOCYTES RELATIVE PERCENT: 12.5 % (ref 2–12)
NEUTROPHILS ABSOLUTE: 5.01 E9/L (ref 1.8–7.3)
NEUTROPHILS RELATIVE PERCENT: 65.4 % (ref 43–80)
PDW BLD-RTO: 13.2 FL (ref 11.5–15)
PLATELET # BLD: 203 E9/L (ref 130–450)
PMV BLD AUTO: 10.4 FL (ref 7–12)
POTASSIUM REFLEX MAGNESIUM: 3.7 MMOL/L (ref 3.5–5)
RBC # BLD: 4.64 E12/L (ref 3.8–5.8)
REASON FOR REJECTION: NORMAL
REJECTED TEST: NORMAL
SODIUM BLD-SCNC: 136 MMOL/L (ref 132–146)
TROPONIN: <0.01 NG/ML (ref 0–0.03)
TROPONIN: <0.01 NG/ML (ref 0–0.03)
WBC # BLD: 7.7 E9/L (ref 4.5–11.5)

## 2020-02-09 PROCEDURE — 80048 BASIC METABOLIC PNL TOTAL CA: CPT

## 2020-02-09 PROCEDURE — 93005 ELECTROCARDIOGRAM TRACING: CPT | Performed by: NURSE PRACTITIONER

## 2020-02-09 PROCEDURE — 6370000000 HC RX 637 (ALT 250 FOR IP): Performed by: NURSE PRACTITIONER

## 2020-02-09 PROCEDURE — 99284 EMERGENCY DEPT VISIT MOD MDM: CPT

## 2020-02-09 PROCEDURE — 84484 ASSAY OF TROPONIN QUANT: CPT

## 2020-02-09 PROCEDURE — 83605 ASSAY OF LACTIC ACID: CPT

## 2020-02-09 PROCEDURE — 71046 X-RAY EXAM CHEST 2 VIEWS: CPT

## 2020-02-09 PROCEDURE — 85025 COMPLETE CBC W/AUTO DIFF WBC: CPT

## 2020-02-09 RX ORDER — ASPIRIN 81 MG/1
324 TABLET, CHEWABLE ORAL ONCE
Status: COMPLETED | OUTPATIENT
Start: 2020-02-09 | End: 2020-02-09

## 2020-02-09 RX ORDER — MAGNESIUM OXIDE 400 MG/1
400 TABLET ORAL DAILY
COMMUNITY
End: 2020-09-21 | Stop reason: CLARIF

## 2020-02-09 RX ADMIN — ASPIRIN 81 MG 324 MG: 81 TABLET ORAL at 18:10

## 2020-02-09 NOTE — ED PROVIDER NOTES
ED Attending  CC: Emily       Department of Emergency Medicine   ED  Provider Note  Admit Date/RoomTime: 2/9/2020  5:30 PM  ED Room: 19/19   Chief Complaint     Hypertension (states the last two days his BP has been higher than normal; highest of 024 systolically; states he is asymptomatic )    History of Present Illness   Source of history provided by:  patient. History/Exam Limitations: none. Bill Arteaga is a 80 y.o. old male who has a past medical history of:   Past Medical History:   Diagnosis Date    Carotid artery occlusion     Chronic pain of left knee     Crohn disease ([address]     Hypertension     Mitral regurgitation     Osteopenia     Osteoporosis     Tricuspid regurgitation     WPW (Donato-Parkinson-White syndrome)     had ablation to correct      presents to the emergency department by private vehicle, ambulatory and accompanied by spouse/SO, with complaints of sudden onset of hypertesnion after vacuming last night causing him to feel \"flushed\". He denies any Chest pain, headache, vision changed or Shortness of breath. Duration of symptoms: he states he took his Norvasc last night and woke up feeling okay but throughout the day he began feeling flushed again and his blood pressure was raising. Symptom(s) now is 0 /10. His symptoms are associated with nothing. The symptoms are worsened by nothing and relieved by nothing. There has been No chest pain, No shortness of breath, No dyspnea on exertion, No orthopnea, No paroxysmal nocturnal dyspnea, No edema, No palpitations and No syncope associated with complaint. His cardiac risk factors are advanced age (older than 54 for men, 72 for women), hypertension and male gender. Care prior to arrival consisted of nothing, with no relief. Patient states he had a stress test last year (April 2019) and was told everything was okay. 10 years ago he had a cardiac ablation due to Donato-Parkinson-White syndrome at [facility]     .   LEXI Pertinent positives and negatives are stated within HPI, all other systems reviewed and are negative. Past Surgical History:   Procedure Laterality Date    ABLATION OF DYSRHYTHMIC FOCUS  2009    wpw    COLECTOMY      x 3  Due to Crohns Disease  Age 52, Age 46 and 5   [employer] INGUINAL HERNIA REPAIR Right     Undecended testicle   Social History:  reports that he is a non-smoker but has been exposed to tobacco smoke. He has never used smokeless tobacco. He reports that he does not drink alcohol or use drugs. Family History: family history includes Cancer in his father; Heart Disease in his mother. Allergies: Sumycin [tetracycline hcl] and Tetracyclines & related    Physical Exam           ED Triage Vitals [02/09/20 1727]   BP Temp Temp Source Pulse Resp SpO2 Height Weight   (!) 174/79 97.9 °F (36.6 °C) Oral 116 14 94 % 5' 7\" (1.702 m) 160 lb (72.6 kg)      Oxygen Saturation Interpretation: Normal.    General Appearance/Constitutional:  Alert, development consistent with age, NAD. HEENT:  NC/NT. PERRLA. Airway patent. Neck:  Normal ROM. Supple. Respiratory:  Clear to auscultation and breath sounds equal.  CV:  Regular rate and rhythm, normal heart sounds, without pathological murmurs, ectopy, gallops, or rubs. Chest:  Symmetrical without visible rash or tenderness. GI:  Abdomen Soft, nontender, good bowel sounds. No firm or pulsatile mass. Scar from surgery due to Crohn's. Back:  No costovertebral tenderness. Extremities: No tenderness or edema noted. Lymphatics: no lymphadenopathy noted  Integument:  Normal turgor. Warm, dry, without visible rash, unless noted elsewhere. Neurological:  Oriented. Motor functions intact.    Psychiatric:  Affect normal.    Lab / Imaging Results   (All laboratory and radiology results have been personally reviewed by myself)  Labs:  Results for orders placed or performed during the hospital encounter of 02/09/20   CBC Auto Differential   Result Value Ref Range    WBC 7.7 4.5 - 11.5 E9/L    RBC 4.64 3.80 - 5.80 E12/L    Hemoglobin 15.4 12.5 - 16.5 g/dL    Hematocrit 46.4 37.0 - 54.0 %    .0 (H) 80.0 - 99.9 fL    MCH 33.2 26.0 - 35.0 pg    MCHC 33.2 32.0 - 34.5 %    RDW 13.2 11.5 - 15.0 fL    Platelets 375 210 - 542 E9/L    MPV 10.4 7.0 - 12.0 fL    Neutrophils % 65.4 43.0 - 80.0 %    Immature Granulocytes % 0.3 0.0 - 5.0 %    Lymphocytes % 19.3 (L) 20.0 - 42.0 %    Monocytes % 12.5 (H) 2.0 - 12.0 %    Eosinophils % 1.7 0.0 - 6.0 %    Basophils % 0.8 0.0 - 2.0 %    Neutrophils Absolute 5.01 1.80 - 7.30 E9/L    Immature Granulocytes # 0.02 E9/L    Lymphocytes Absolute 1.48 (L) 1.50 - 4.00 E9/L    Monocytes Absolute 0.96 (H) 0.10 - 0.95 E9/L    Eosinophils Absolute 0.13 0.05 - 0.50 E9/L    Basophils Absolute 0.06 0.00 - 0.20 E9/L   Lactic Acid, Plasma   Result Value Ref Range    Lactic Acid 1.3 0.5 - 2.2 mmol/L   SPECIMEN REJECTION   Result Value Ref Range    Rejected Test bmpx, trop     Reason for Rejection see below    Troponin   Result Value Ref Range    Troponin <0.01 0.00 - 0.03 ng/mL   Basic Metabolic Panel w/ Reflex to MG   Result Value Ref Range    Sodium 136 132 - 146 mmol/L    Potassium reflex Magnesium 3.7 3.5 - 5.0 mmol/L    Chloride 102 98 - 107 mmol/L    CO2 24 22 - 29 mmol/L    Anion Gap 10 7 - 16 mmol/L    Glucose 100 (H) 74 - 99 mg/dL    BUN 11 8 - 23 mg/dL    CREATININE 0.7 0.7 - 1.2 mg/dL    GFR Non-African American >60 >=60 mL/min/1.73    GFR African American >60     Calcium 8.9 8.6 - 10.2 mg/dL   Troponin   Result Value Ref Range    Troponin <0.01 0.00 - 0.03 ng/mL   EKG 12 Lead   Result Value Ref Range    Ventricular Rate 106 BPM    Atrial Rate 106 BPM    P-R Interval 144 ms    QRS Duration 82 ms    Q-T Interval 344 ms    QTc Calculation (Bazett) 456 ms    P Axis 42 degrees    R Axis -4 degrees    T Axis 46 degrees       Imaging: All Radiology results interpreted by Radiologist unless otherwise noted.   XR CHEST STANDARD (2 VW)   Final Result   No acute cardiopulmonary pathology. Mild emphysematous hyperinflation. EKG #1:  Interpreted by emergency department physician (Dr. Ganesh Baldwin) unless otherwise noted. Time:  1806  Rate: 106  Rhythm: Sinus. Interpretation: Sinus tach, No STEMI      ED Course / Medical Decision Making     Medications   aspirin chewable tablet 324 mg (324 mg Oral Given 2/9/20 1810)        Re-Evaluations:  2/9/20      Time: 1939-reviewed results with patient. Continues to deny any chest pain or shortness of breath. Patient's blood pressure has decreased to 142/70 at this time. Patients symptoms are improving. Consultations:             IP CONSULT TO Advaction with Dr. Boby Browne who is covering for Dr. Kendra Schneider regarding patient coming to the hospital for observation. She feels this is not cardiac related due to negative cardiac work-up, with no chest pain, only elevated BP and negative stress test within the year. She feels patient does not meet obeservation criteria. She is requesting repeating troponin in 3 hours and if negative to be discharged home and follow up outpatient for blood pressure monitoring. 2230- Reported off to Dr. Ganesh Baldwin for further evaluation and review troponin when resulted. Procedures:   none    MDM: repeat trop neg, dc home, feel not cardiac equivalent, feel from bp, bp stable now, fu outpt to monitor and adjust meds with pcp       Counseling:   I have spoken with the spouse and patient and discussed todays results, in addition to providing specific details for the plan of care and counseling regarding the diagnosis and prognosis and are agreeable with the plan . Assessment      1.  Hypertension, unspecified type      This patient's ED course included: a personal history and physicial examination, re-evaluation prior to disposition, multiple bedside re-evaluations, cardiac monitoring and continuous pulse oximetry  This patient has remained hemodynamically stable

## 2020-02-10 LAB
EKG ATRIAL RATE: 106 BPM
EKG P AXIS: 42 DEGREES
EKG P-R INTERVAL: 144 MS
EKG Q-T INTERVAL: 344 MS
EKG QRS DURATION: 82 MS
EKG QTC CALCULATION (BAZETT): 456 MS
EKG R AXIS: -4 DEGREES
EKG T AXIS: 46 DEGREES
EKG VENTRICULAR RATE: 106 BPM

## 2020-02-10 PROCEDURE — 93010 ELECTROCARDIOGRAM REPORT: CPT | Performed by: INTERNAL MEDICINE

## 2020-02-10 NOTE — ED NOTES
Discharge instructions given and reviewed with patient with friend. Instructed to follow up with Dr Rita Zaman. Questions and concerns addressed. Pt departed ED ambulatory in no apparent distress with friend. Personal belongings taken.      Steffany Baugh RN  02/09/20 [id number]

## 2020-09-21 ENCOUNTER — OFFICE VISIT (OUTPATIENT)
Dept: CARDIOLOGY CLINIC | Age: 85
End: 2020-09-21
Payer: MEDICARE

## 2020-09-21 VITALS
DIASTOLIC BLOOD PRESSURE: 78 MMHG | HEART RATE: 71 BPM | SYSTOLIC BLOOD PRESSURE: 118 MMHG | RESPIRATION RATE: 12 BRPM | HEIGHT: 67 IN | WEIGHT: 155 LBS | BODY MASS INDEX: 24.33 KG/M2

## 2020-09-21 PROCEDURE — 4040F PNEUMOC VAC/ADMIN/RCVD: CPT | Performed by: INTERNAL MEDICINE

## 2020-09-21 PROCEDURE — 1123F ACP DISCUSS/DSCN MKR DOCD: CPT | Performed by: INTERNAL MEDICINE

## 2020-09-21 PROCEDURE — 99214 OFFICE O/P EST MOD 30 MIN: CPT | Performed by: INTERNAL MEDICINE

## 2020-09-21 PROCEDURE — 1036F TOBACCO NON-USER: CPT | Performed by: INTERNAL MEDICINE

## 2020-09-21 PROCEDURE — G8420 CALC BMI NORM PARAMETERS: HCPCS | Performed by: INTERNAL MEDICINE

## 2020-09-21 PROCEDURE — G8427 DOCREV CUR MEDS BY ELIG CLIN: HCPCS | Performed by: INTERNAL MEDICINE

## 2020-09-21 PROCEDURE — 93000 ELECTROCARDIOGRAM COMPLETE: CPT | Performed by: INTERNAL MEDICINE

## 2020-09-21 RX ORDER — BRIMONIDINE TARTRATE 2 MG/ML
1 SOLUTION/ DROPS OPHTHALMIC 3 TIMES DAILY
COMMUNITY

## 2020-09-21 RX ORDER — LISINOPRIL 10 MG/1
10 TABLET ORAL DAILY
COMMUNITY

## 2020-09-21 NOTE — PROGRESS NOTES
Patient Active Problem List   Diagnosis    WPW (Donato-Parkinson-White syndrome) - s/p ablation    Cro[facility]'s disease ([facility] [address]    Mitral regurgitation - mild     Tricuspid regurgitation - moderate     Essential hypertension    Chest pain    Atrial tachycardia (HCC)       Current Outpatient Medications   Medication Sig Dispense Refill    lisinopril (PRINIVIL;ZESTRIL) 10 MG tablet Take 10 mg by mouth daily      brimonidine (ALPHAGAN) 0.2 % ophthalmic solution 1 drop 3 times daily      Multiple Vitamins-Minerals (PRESERVISION/LUTEIN) CAPS Take 1 capsule by mouth 2 times daily       Cholecalciferol (VITAMIN D) 2000 units CAPS capsule Take 1 capsule by mouth daily       Omega-3 Fatty Acids (FISH OIL OMEGA-3 PO) Take 1 capsule by mouth daily       atorvastatin (LIPITOR) 20 MG tablet Take 20 mg by mouth nightly       Calcium Citrate-Vitamin D 200-200 MG-UNIT TABS Take 1,200 mg by mouth daily. Pill also has Vit D 800 Units Pt takes two pills a day      Lutein 40 MG CAPS Take 40 mg by mouth daily       Coenzyme Q10 100 MG CAPS Take 200 mg by mouth daily.  amlodipine (NORVASC) 5 MG tablet Take 5 mg by mouth nightly       mesalamine (PENTASA) 250 MG CR capsule Take 250 mg by mouth Takes 6 tab three time daily      Multiple Vitamin (MULTIVITAMIN PO) Take  by mouth daily.  Bilberry 1000 MG CAPS Take 2,000 mg by mouth daily        No current facility-administered medications for this visit. CC:    Patient is seen in follow up for:  1. Essential hypertension    2. WPW (Donato-Parkinson-White syndrome) - s/p ablation    3. Nonrheumatic mitral valve regurgitation    4. Nonrheumatic tricuspid valve regurgitation        HPI:  Patient is doing well without any specific cardiac problems. Patient denies any shortness of breath, chest pain, lightheadedness or dizziness. Patient is tolerating medications well without side effects. Walks daily without any difficulties.   Does have questions regarding his aspirin and coenzyme Q 10 therapy. ROS:   General: No unusual weight gain, no change in exercise tolerance  Skin: No rash or itching  EENT: No vision changes or nosebleeds  Cardiovascular: No orthopnea or paroxysmal nocturnal dyspnea  Respiratory: No cough or hemoptysis  Gastrointestinal: No hematemesis or recent changes in bowel habits  Genitourinary: No hematuria, urgency or frequency  Musculoskeletal: No muscular weakness or joint swelling   Neurologic / Psychiatric: No incoordination or convulsions  Allergic / Immunologic/ Lymphatic / Endocrine: No anemia or bleeding tendency        Social History     Socioeconomic History    Marital status:      Spouse name: Not on file    Number of children: Not on file    Years of education: Not on file    Highest education level: Not on file   Occupational History    Not on file   Social Needs    Financial resource strain: Not on file    Food insecurity     Worry: Not on file     Inability: Not on file    Transportation needs     Medical: Not on file     Non-medical: Not on file   Tobacco Use    Smoking status: Passive Smoke Exposure - Never Smoker    Smokeless tobacco: Never Used    Tobacco comment: 55 year due to second hand from wife.    Substance and Sexual Activity    Alcohol use: No    Drug use: No    Sexual activity: Not Currently   Lifestyle    Physical activity     Days per week: Not on file     Minutes per session: Not on file    Stress: Not on file   Relationships    Social connections     Talks on phone: Not on file     Gets together: Not on file     Attends [employer] service: Not on file     Active member of club or organization: Not on file     Attends meetings of clubs or organizations: Not on file     Relationship status: Not on file    Intimate partner violence     Fear of current or ex partner: Not on file     Emotionally abused: Not on file     Physically abused: Not on file     Forced sexual activity: Not on file   Other Topics Concern    Not on file   Social History Narrative    Not on file       Past Medical History:   Diagnosis Date    Carotid artery occlusion     Chronic pain of left knee     Cro[facility] disease ([facility] [address]     Hypertension     Mitral regurgitation     Osteopenia     Osteoporosis     Tricuspid regurgitation     WPW (Donato-Parkinson-White syndrome)     had ablation to correct       PHYSICAL EXAM:  CONSTITUTIONAL:  Well developed, well nourished    Vitals:    09/21/20 1125   BP: 118/78   Pulse: 71   Resp: 12   Weight: 155 lb (70.3 kg)   Height: 5' 7\" (1.702 m)     HEAD & FACE: Normocephalic. Symmetric. EYES: No xanthelasma. Conjunctivae not injected. EARS, NOSE, MOUTH & THROAT: Good dentition. No oral pallor or cyanosis. NECK: No JVD at 30 degrees. No thyromegaly. RESPIRATORY: Clear to auscultation and percussion in all fields. No use of accessory muscle or intercostal retractions. CARDIOVASCULAR: Regular rate and rhythm. No lifts or thrills on palpitation. Auscultation with normal S1-S2 in intensity and splitting. No carotid bruits. Abdominal aorta not enlarged. Femoral arteries without bruits. Pedal pulses 2+. No edema. ABDOMEN: Soft without hepatic or splenic enlargement. No tenderness. MUSCULOSKELETAL: No kyphosis or scoliosis of the back. Good muscle strength and tone. No muscle atrophy. Normal gait and ability to undergo exercise stress testing. EXTREMITIES: No clubbing or cyanosis. SKIN: No Xanthomas or ulcerations. NEUROLOGIC: Oriented to time, place and person. Normal mood and affect. LYMPHATIC:  No palpable neck or supraclavicular nodes. No splenomegaly. EKG: Normal sinus rhythm. Short ND No change compared to prior tracing. ASSESSMENT:                                                     ORDERS:       Diagnosis Orders   1. Essential hypertension  EKG 12 lead   2. WPW (Donato-Parkinson-White syndrome) - s/p ablation     3.  Nonrheumatic mitral valve regurgitation

## 2020-12-03 ENCOUNTER — OFFICE VISIT (OUTPATIENT)
Dept: ORTHOPEDIC SURGERY | Age: 85
End: 2020-12-03
Payer: MEDICARE

## 2020-12-03 VITALS — TEMPERATURE: 97.9 F | BODY MASS INDEX: 24.33 KG/M2 | WEIGHT: 155 LBS | HEIGHT: 67 IN

## 2020-12-03 PROCEDURE — G8427 DOCREV CUR MEDS BY ELIG CLIN: HCPCS | Performed by: ORTHOPAEDIC SURGERY

## 2020-12-03 PROCEDURE — 99213 OFFICE O/P EST LOW 20 MIN: CPT | Performed by: ORTHOPAEDIC SURGERY

## 2020-12-03 PROCEDURE — G8420 CALC BMI NORM PARAMETERS: HCPCS | Performed by: ORTHOPAEDIC SURGERY

## 2020-12-03 PROCEDURE — 1123F ACP DISCUSS/DSCN MKR DOCD: CPT | Performed by: ORTHOPAEDIC SURGERY

## 2020-12-03 PROCEDURE — G8484 FLU IMMUNIZE NO ADMIN: HCPCS | Performed by: ORTHOPAEDIC SURGERY

## 2020-12-03 PROCEDURE — 1036F TOBACCO NON-USER: CPT | Performed by: ORTHOPAEDIC SURGERY

## 2020-12-03 PROCEDURE — 4040F PNEUMOC VAC/ADMIN/RCVD: CPT | Performed by: ORTHOPAEDIC SURGERY

## 2020-12-03 RX ORDER — ASPIRIN 81 MG/1
81 TABLET ORAL DAILY
COMMUNITY
End: 2021-09-17

## 2020-12-04 NOTE — PROGRESS NOTES
Chief Complaint:   Chief Complaint   Patient presents with    Leg Pain     Left thigh pain for 3 weeks, having difficulty lifting leg. No redness or warm to touch. Denies injury, mentioned might be from using lawn tractor. No xrays.  Arm Pain     Right forearm pain and wrist x 3 weeks. Has hurt since blowing leaves for 4 hours. No xray. Kayla Maria presents with left thigh and right forearm pain, onset of both of these seem to follow some clear overuse well gardening and loading and unloading his tractor. Also using a leaf blower. No previous problems in these regards, no other joint complaints. Right forearm pain is dorsal, improving with time and rest.  Denies numbness tingling in the hand. Left thigh pain is anterior radiating distal to the hip down to the anterior knee. No mechanical symptoms, no fever chills sweats weight loss or other systemic symptom. Weightbearing without assistance. Allergies; medications; past medical, surgical, family, and social history; and problem list have been reviewed today and updated as indicated in this encounter seen below. Exam: Upper extremity exam is unremarkable other than very mild tenderness in the dorsal extensor compartment of the right forearm, mild pain but no weakness with resisted wrist extension no objective motor or sensory deficits without intrinsic thenar atrophy. Compartments are soft not swollen. Leg lengths equal hip motion is painless, mild tenderness to palpation of the proximal left quadriceps with pain and some weakness on resisted hip flexion. Straight leg raising negative. Knees are straight and stable without laxity deformity or effusion. Radiographs: X-rays of the left femur hip and knee today show good bone quality, left hip does show moderate loss of joint space with some early sclerosis consistent with moderate osteoarthritis left hip.   Left knee does show as previously noted joint space narrowing with CPPD which is stable. No evidence for neoplastic or traumatic issue otherwise in the femur. Oswaldo Ramos was seen today for leg pain and arm pain. Diagnoses and all orders for this visit:    Pain of left thigh  -     XR FEMUR LEFT (MIN 2 VIEWS); Future       Impression is sprain strain left quadriceps and right forearm. No evidence for structural or significant degenerative disease other than some early findings in the left hip which may also be contributing to his pain. Obviously not considering surgical invention at this time, he was advised as to these findings, advised to moderate activities to tolerance continue with relative rest and resume his normal exercise program to tolerance. Questions asked and answered follow-up as needed. - Counseling More Than 50% of the Appointment Time: 15 minutes    Return if symptoms worsen or fail to improve. Current Outpatient Medications   Medication Sig Dispense Refill    aspirin 81 MG EC tablet Take 81 mg by mouth daily      LYCOPENE PO Take 20 mg by mouth daily      lisinopril (PRINIVIL;ZESTRIL) 10 MG tablet Take 10 mg by mouth daily      brimonidine (ALPHAGAN) 0.2 % ophthalmic solution 1 drop 3 times daily      Multiple Vitamins-Minerals (PRESERVISION/LUTEIN) CAPS Take 1 capsule by mouth 2 times daily       Cholecalciferol (VITAMIN D) 2000 units CAPS capsule Take 1 capsule by mouth daily       Omega-3 Fatty Acids (FISH OIL OMEGA-3 PO) Take 1,200 mg by mouth daily       atorvastatin (LIPITOR) 20 MG tablet Take 20 mg by mouth nightly       Calcium Citrate-Vitamin D 200-200 MG-UNIT TABS Take 1,200 mg by mouth daily. Pill also has Vit D 800 Units Pt takes two pills a day      Lutein 40 MG CAPS Take 40 mg by mouth daily       Coenzyme Q10 100 MG CAPS Take 200 mg by mouth daily.       amlodipine (NORVASC) 5 MG tablet Take 5 mg by mouth nightly       mesalamine (PENTASA) 250 MG CR capsule Take 250 mg by mouth Takes 6 tab three time daily      Multiple Vitamin (MULTIVITAMIN PO) Take  by mouth daily.  Bilberry 1000 MG CAPS Take 2,000 mg by mouth daily        No current facility-administered medications for this visit. Patient Active Problem List   Diagnosis    WPW (Donato-Parkinson-White syndrome) - s/p ablation    Crohn's disease ([address]    Mitral regurgitation - mild     Tricuspid regurgitation - moderate     Essential hypertension    Chest pain    Atrial tachycardia (HCC)       Past Medical History:   Diagnosis Date    Carotid artery occlusion     Chronic pain of left knee     Crohn disease ([address]     Hypertension     Mitral regurgitation     Osteopenia     Osteoporosis     Tricuspid regurgitation     WPW (Donato-Parkinson-White syndrome)     had ablation to correct       Past Surgical History:   Procedure Laterality Date    ABLATION OF DYSRHYTHMIC FOCUS  2009    wpw    COLECTOMY      x 3  Due to Crohns Disease  Age 52, Age 46 and 5   [facility] INGUINAL HERNIA REPAIR Right     Undecended testicle       Allergies   Allergen Reactions    Sumycin [Tetracycline Hcl]     Tetracyclines & Related Itching and Rash     sumyacin       Social History     Socioeconomic History    Marital status:      Spouse name: None    Number of children: None    Years of education: None    Highest education level: None   Occupational History    None   Social Needs    Financial resource strain: None    Food insecurity     Worry: None     Inability: None    Transportation needs     Medical: None     Non-medical: None   Tobacco Use    Smoking status: Passive Smoke Exposure - Never Smoker    Smokeless tobacco: Never Used    Tobacco comment: 55 year due to second hand from wife.    Substance and Sexual Activity    Alcohol use: No    Drug use: No    Sexual activity: Not Currently   Lifestyle    Physical activity     Days per week: None     Minutes per session: None    Stress: None   Relationships    Social connections     Talks on phone: None     Gets together: None     Attends Mosque service: None     Active member of club or organization: None     Attends meetings of clubs or organizations: None     Relationship status: None    Intimate partner violence     Fear of current or ex partner: None     Emotionally abused: None     Physically abused: None     Forced sexual activity: None   Other Topics Concern    None   Social History Narrative    None       Family History   Problem Relation Age of Onset    Heart Disease Mother     Cancer Father         Stomach Cancer         Review of Systems  As follows except as previously noted in HPI:  Constitutional: Negative for chills, diaphoresis, fatigue, fever and unexpected weight change. Respiratory: Negative for cough, shortness of breath and wheezing. Cardiovascular: Negative for chest pain and palpitations. Neurological: Negative for dizziness, syncope, cephalgia. GI / : negative  Musculoskeletal: see HPI       Objective:   Physical Exam   Constitutional: Oriented to person, place, and time. and appears well-developed and well-nourished. :   Head: Normocephalic and atraumatic. Eyes: EOM are normal.   Neck: Neck supple. Cardiovascular: Normal rate and regular rhythm. Pulmonary/Chest: Effort normal. No stridor. No respiratory distress, no wheezes. Abdominal:  No abnormal distension. Neurological: Alert and oriented to person, place, and time. Skin: Skin is warm and dry. Psychiatric: Normal mood and affect.  Behavior is normal. Thought content normal.    12/4/2020  10:39 AM

## 2021-04-20 ENCOUNTER — OFFICE VISIT (OUTPATIENT)
Dept: PHYSICAL MEDICINE AND REHAB | Age: 86
End: 2021-04-20
Payer: MEDICARE

## 2021-04-20 VITALS
BODY MASS INDEX: 24.01 KG/M2 | HEIGHT: 67 IN | HEART RATE: 57 BPM | WEIGHT: 153 LBS | DIASTOLIC BLOOD PRESSURE: 75 MMHG | SYSTOLIC BLOOD PRESSURE: 135 MMHG

## 2021-04-20 DIAGNOSIS — G89.29 CHRONIC RIGHT-SIDED LOW BACK PAIN WITH RIGHT-SIDED SCIATICA: Primary | ICD-10-CM

## 2021-04-20 DIAGNOSIS — M54.41 CHRONIC RIGHT-SIDED LOW BACK PAIN WITH RIGHT-SIDED SCIATICA: Primary | ICD-10-CM

## 2021-04-20 DIAGNOSIS — M47.816 LUMBAR SPONDYLOSIS: ICD-10-CM

## 2021-04-20 DIAGNOSIS — M54.17 LUMBOSACRAL RADICULITIS: ICD-10-CM

## 2021-04-20 PROCEDURE — 1123F ACP DISCUSS/DSCN MKR DOCD: CPT | Performed by: PHYSICAL MEDICINE & REHABILITATION

## 2021-04-20 PROCEDURE — 4040F PNEUMOC VAC/ADMIN/RCVD: CPT | Performed by: PHYSICAL MEDICINE & REHABILITATION

## 2021-04-20 PROCEDURE — 99214 OFFICE O/P EST MOD 30 MIN: CPT | Performed by: PHYSICAL MEDICINE & REHABILITATION

## 2021-04-20 PROCEDURE — 1036F TOBACCO NON-USER: CPT | Performed by: PHYSICAL MEDICINE & REHABILITATION

## 2021-04-20 PROCEDURE — G8420 CALC BMI NORM PARAMETERS: HCPCS | Performed by: PHYSICAL MEDICINE & REHABILITATION

## 2021-04-20 PROCEDURE — G8427 DOCREV CUR MEDS BY ELIG CLIN: HCPCS | Performed by: PHYSICAL MEDICINE & REHABILITATION

## 2021-04-20 RX ORDER — METHYLPREDNISOLONE 4 MG/1
TABLET ORAL
Qty: 1 KIT | Refills: 0 | Status: SHIPPED
Start: 2021-04-20 | End: 2021-09-15 | Stop reason: ALTCHOICE

## 2021-04-20 NOTE — PROGRESS NOTES
Doris Desai, [address] [facility]  [address] [facility] [place]  Phone: [phone]  Fax: [phone]    PCP: Maryjo Garnica MD  Date of visit: 4/20/21    Chief Complaint   Patient presents with    Lower Back Pain     Interval:   Patient presents today for office visit regarding right leg and low back pain. This started after starting walking on the treadmill again after taking a year off from covid. Pain in located in lateral right leg and thigh and has back pain especially when walking or standing. The pain is rated Pain Score:   7, is described as achy and sometimes burning. The pain is better with rest.  The pain is worse with standing and walking. There is no associated numbness/tingling. There is weakness in both legs. There is no bowel/bladder changes. The prior workup has included: MRI L Spine     The prior treatment has included:  PT: yes. Chiropractic: none    Modalities: none   OTC Tylenol: yes with relief   NSAIDS: occasional ibuprofen.      Opioids: none    Membrane stabilizers: none   Muscle relaxers: none   Previous injections: none    Previous surgery at this site: none    Allergies   Allergen Reactions    Sumycin [Tetracycline Hcl]     Tetracyclines & Related Itching and Rash     sumyacin       Current Outpatient Medications   Medication Sig Dispense Refill    methylPREDNISolone (MEDROL DOSEPACK) 4 MG tablet Take by mouth as directed 1 kit 0    aspirin 81 MG EC tablet Take 81 mg by mouth daily      LYCOPENE PO Take 20 mg by mouth daily      lisinopril (PRINIVIL;ZESTRIL) 10 MG tablet Take 10 mg by mouth daily      brimonidine (ALPHAGAN) 0.2 % ophthalmic solution 1 drop 3 times daily      Multiple Vitamins-Minerals (PRESERVISION/LUTEIN) CAPS Take 1 capsule by mouth 2 times daily       Cholecalciferol (VITAMIN D) 2000 units CAPS capsule Take 1 capsule by mouth daily       Omega-3 Fatty Acids (FISH OIL OMEGA-3 PO) vision changes    Ears/Nose/Throat: Denies nasal congestion or sore throat     Respiratory: Denies SOB or cough     Cardiovascular: Denies CP, palpitations, edema      Gastrointestinal: Denies abdominal pain,  N/V, constipation, or diarrhea    Genitourinary: Denies urinary symptoms    Neurologic: See HPI.     MSK: See HPI. Psychiatric: Denies sleep disturbance, anxiety, depression    Hematologic/Lymphatic/Immunologic: +Easy bruising       Physical Exam:   Blood pressure 135/75, pulse 57, height 5' 7\" (1.702 m), weight 153 lb (69.4 kg). General: well developed and well nourished in no acute distress. Body habitus is thin  HEENT: No rhinorrhea, sneezing, yawning, or lacrimation. No scleral icterus or conjunctival injection. Resp: symmetrical chest expansion, unlabored breathing, respirations unlabored. CV: Heart rate is regular. Peripheral pulses are palpable  Lymph: No visible regional lymphadenopathy. Skin: No rashes or ecchymosis. Normal turgor. Psych: Mood is calm. Affect is normal.   Ext: No edema noted     MSK:   Back/Hip Exam:   Inspection: Pelvis was symmetric. Lumbar lordotic curvature was decreased. There was no scoliosis. No ecchymoses or erythema. Palpation: Palpatory exam revealed no tenderness along lumbosacral paraspinals, midline spine, SI joint sulcus, greater trochanters and TFL on both side. TTP right glute. There was no paraspinal spasms. There were no trigger points. ROM: ROM decreased   Special/provocative testing:   Seating SLR negative. Neurological Exam:  Strength:   R  L  Hip Flex  4 4  Knee Ext  5  5  Ankle dorsi  4 4  EHL   4 4  Ankle Plantar  5  5    Sensory:  Intact for light touch in all lower extremity dermatomes. Reflexes:   R  L  Patellar  (1+) (1+)  Ankle Jerk  (0) (0)      Gai: narrow based, externally rotated feet. Forward flexed at hips, slightly unsteady. Imaging:    Impression:   Ailyn Prieto is a 80 y.o. male    1.  Chronic right-sided low back pain with right-sided sciatica    2. Lumbosacral radiculitis    3. Lumbar spondylosis     right L5     Plan:   · Refer to PT   · MRI reviewed. Patient's daughter would like an updated MRI. · Medrol dose chester. · Discussed epidural if needed.  The patient was educated about the diagnosis, prognosis, indications, risks and benefits of treatment. An opportunity to ask questions was given to the patient and questions were answered. The patient agreed to proceed with the recommended treatment as described above. Follow up- will call with MRI results.      Lv Sandy DO, [facility]   Board Certified Physical Medicine and Rehabilitation

## 2021-05-19 ENCOUNTER — TELEPHONE (OUTPATIENT)
Dept: PHYSICAL MEDICINE AND REHAB | Age: 86
End: 2021-05-19

## 2021-05-19 NOTE — TELEPHONE ENCOUNTER
Called and left message on patient voicemail that I was calling regarding MRI results. Will wait for return call from patient.

## 2021-05-19 NOTE — TELEPHONE ENCOUNTER
----- Message from Faith Javier DO sent at 5/19/2021  3:17 PM EDT -----  Please call patient with MRI results. There is disc height loss at L4-5 and L5-S1 and arthritis which causes both severe central and neuro-foraminal narrowing at these levels. The findings on the right correlate with his symptoms. There has been mild worsening since his last one in 2019 but no fractures or other concerning lesions.

## 2021-05-20 NOTE — TELEPHONE ENCOUNTER
2nd attempt to contact the patient. Called and left message on patient answering machine to call office to schedule an appointment to go over MRI results. Will wait for return call from patient.

## 2021-06-08 ENCOUNTER — OFFICE VISIT (OUTPATIENT)
Dept: PHYSICAL MEDICINE AND REHAB | Age: 86
End: 2021-06-08
Payer: MEDICARE

## 2021-06-08 VITALS
DIASTOLIC BLOOD PRESSURE: 70 MMHG | HEIGHT: 67 IN | WEIGHT: 155 LBS | SYSTOLIC BLOOD PRESSURE: 131 MMHG | BODY MASS INDEX: 24.33 KG/M2 | HEART RATE: 89 BPM

## 2021-06-08 DIAGNOSIS — M47.816 LUMBAR SPONDYLOSIS: Primary | ICD-10-CM

## 2021-06-08 DIAGNOSIS — M54.17 RADICULOPATHY, LUMBOSACRAL REGION: ICD-10-CM

## 2021-06-08 DIAGNOSIS — M54.17 LUMBOSACRAL RADICULITIS: ICD-10-CM

## 2021-06-08 PROCEDURE — 1123F ACP DISCUSS/DSCN MKR DOCD: CPT | Performed by: PHYSICAL MEDICINE & REHABILITATION

## 2021-06-08 PROCEDURE — G8427 DOCREV CUR MEDS BY ELIG CLIN: HCPCS | Performed by: PHYSICAL MEDICINE & REHABILITATION

## 2021-06-08 PROCEDURE — 1036F TOBACCO NON-USER: CPT | Performed by: PHYSICAL MEDICINE & REHABILITATION

## 2021-06-08 PROCEDURE — 99213 OFFICE O/P EST LOW 20 MIN: CPT | Performed by: PHYSICAL MEDICINE & REHABILITATION

## 2021-06-08 PROCEDURE — G8420 CALC BMI NORM PARAMETERS: HCPCS | Performed by: PHYSICAL MEDICINE & REHABILITATION

## 2021-06-08 PROCEDURE — 4040F PNEUMOC VAC/ADMIN/RCVD: CPT | Performed by: PHYSICAL MEDICINE & REHABILITATION

## 2021-06-08 RX ORDER — AMLODIPINE BESYLATE 5 MG/1
TABLET ORAL
COMMUNITY
Start: 2021-04-05

## 2021-06-08 RX ORDER — DORZOLAMIDE HCL 20 MG/ML
SOLUTION/ DROPS OPHTHALMIC
COMMUNITY
Start: 2021-05-07 | End: 2021-10-11

## 2021-06-08 RX ORDER — NETARSUDIL 0.2 MG/ML
SOLUTION/ DROPS OPHTHALMIC; TOPICAL
COMMUNITY
Start: 2021-05-18

## 2021-06-08 RX ORDER — ATORVASTATIN CALCIUM 40 MG/1
TABLET, FILM COATED ORAL
COMMUNITY
Start: 2021-02-25

## 2021-06-08 NOTE — PROGRESS NOTES
Wilfred Ramirez, [address] [facility]  [address] [facility] [place]  Phone: [phone]  Fax: [phone]    PCP: Wesley Reyes MD  Date of visit: 6/8/21    Chief Complaint   Patient presents with    Lower Back Pain     Interval:   Patient presents today for office visit to review MRI. MRI reveals central and NF stenosis worst at L4-5 and L5-S1. He has continued right sided low back and leg pain. He is currently in therapy without much relief. Steroid pack did not help pain. He takes tylenol TID and an occasional ASA which do help with pain. The pain is rated Pain Score:   4, is described as achy and sometimes burning located in right low back with radiation into right leg. The pain is better with rest.  The pain is worse with standing and walking. There is no associated numbness/tingling. There is weakness in both legs. There is no bowel/bladder changes. The prior workup has included: MRI L Spine     The prior treatment has included:  PT: yes. Chiropractic: none    Modalities: none   OTC Tylenol: yes with relief   NSAIDS: occasional ibuprofen.      Opioids: none    Membrane stabilizers: none   Muscle relaxers: none   Previous injections: none    Previous surgery at this site: none    Allergies   Allergen Reactions    Sumycin [Tetracycline Hcl]     Tetracyclines & Related Itching and Rash     sumyacin       Current Outpatient Medications   Medication Sig Dispense Refill    RHOPRESSA 0.02 % SOLN INSTILL 1 DROP IN LEFT EYE EVERY DAY      dorzolamide (TRUSOPT) 2 % ophthalmic solution INSTILL 1 DROP IN LEFT EYE TWICE DAILY      amLODIPine (NORVASC) 5 MG tablet TAKE ONE TABLET BY MOUTH EVERY DAY      atorvastatin (LIPITOR) 40 MG tablet TAKE ONE-HALF TABLET BY MOUTH AT BEDTIME      methylPREDNISolone (MEDROL DOSEPACK) 4 MG tablet Take by mouth as directed 1 kit 0    aspirin 81 MG EC tablet Take 81 mg by mouth daily      LYCOPENE PO Take The patient is able to ambulate and perform activities of daily living without the use of an assistive device. Occupation: The patient is currently retired       ROS:    Constitutional: Denies fevers, chills, night sweats, unintentional weight loss     Skin: Denies rash or skin changes     Eyes: Denies vision changes    Ears/Nose/Throat: Denies nasal congestion or sore throat     Respiratory: Denies SOB or cough     Cardiovascular: Denies CP, palpitations, edema      Gastrointestinal: Denies abdominal pain,  N/V, constipation, or diarrhea    Genitourinary: Denies urinary symptoms    Neurologic: See HPI.     MSK: See HPI. Psychiatric: Denies sleep disturbance, anxiety, depression    Hematologic/Lymphatic/Immunologic: +Easy bruising       Physical Exam:   Blood pressure 131/70, pulse 89, height 5' 7\" (1.702 m), weight 155 lb (70.3 kg). General: well developed and well nourished in no acute distress. Body habitus is thin  HEENT: No rhinorrhea, sneezing, yawning, or lacrimation. No scleral icterus or conjunctival injection. Resp: symmetrical chest expansion, unlabored breathing, respirations unlabored. CV: Heart rate is regular. Peripheral pulses are palpable  Lymph: No visible regional lymphadenopathy. Skin: No rashes or ecchymosis. Normal turgor. Psych: Mood is calm. Affect is normal.   Ext: No edema noted     MSK:   Back/Hip Exam:   Inspection: Pelvis was symmetric. Lumbar lordotic curvature was decreased. There was no scoliosis. No ecchymoses or erythema. Neurological Exam:  [facility]: narrow based, externally rotated feet. Forward flexed at hips, slightly unsteady. Imaging:   MRI L spine    Impression:   Symone King is a 80 y.o. male    1. Lumbar spondylosis    2. Lumbosacral radiculitis    3. Radiculopathy, lumbosacral region        Plan:   · MRI was reviewed with the patient and his daughter in great detail.    · Discussed treatment options including PT (which he is doing now), medications and epidural. Explained epidural procedure, risks, benefits in great detail. He states he wants to think about it.  The patient was educated about the diagnosis, prognosis, indications, risks and benefits of treatment. An opportunity to ask questions was given to the patient and questions were answered. The patient agreed to proceed with the recommended treatment as described above.      Follow up if needed    Risa Freed DO, [facility]   Board Certified Physical Medicine and Rehabilitation

## 2021-06-08 NOTE — PATIENT INSTRUCTIONS
care is a key part of your treatment and safety. Be sure to make and go to all appointments, and call your doctor if you are having problems. It's also a good idea to know your test results and keep a list of the medicines you take. How do you prepare for the procedure? Procedures can be stressful. This information will help you understand what you can expect. And it will help you safely prepare for your procedure. Preparing for the procedure    · Be sure you have someone to take you home. Anesthesia and pain medicine will make it unsafe for you to drive or get home on your own.     · Understand exactly what procedure is planned, along with the risks, benefits, and other options.     · If you take aspirin or some other blood thinner, ask your doctor if you should stop taking it before your procedure. Make sure that you understand exactly what your doctor wants you to do. These medicines increase the risk of bleeding.     · Tell your doctor ALL the medicines, vitamins, supplements, and herbal remedies you take. Some may increase the risk of problems during your procedure. Your doctor will tell you if you should stop taking any of them before the procedure and how soon to do it.     · Make sure your doctor and the hospital have a copy of your advance directive. If you don't have one, you may want to prepare one. It lets others know your health care wishes. It's a good thing to have before any type of surgery or procedure. What happens on the day of the procedure?    · Your doctor may tell you not to eat or drink for a certain amount of time before the procedure. Follow these instructions carefully.     · Take a bath or shower before you come in for your procedure. Do not apply lotions, perfumes, deodorants, or nail polish. At the hospital or surgery center   · Bring a picture ID.     · You may get medicine that relaxes you or puts you in a light sleep.  The area being worked on will be numb.     · The procedure will take 5 to 15 minutes. You will go home about an hour later. When should you call your doctor? · You have questions or concerns.     · You don't understand how to prepare for your procedure.     · You become ill before the procedure (such as fever, flu, or a cold).     · You need to reschedule or have changed your mind about having the procedure. Where can you learn more? Go to https://[employer]. org and sign in to your [email or web address] account. Enter K468 in the [employer] box to learn more about \"Lumbar Epidural Steroid Injection: Before Your Procedure. \"     If you do not have an account, please click on the \"Sign Up Now\" link. Current as of: November 16, 2020               Content Version: 12.8  © 2006-2021 [employer]. Care instructions adapted under license by [facility] ([facility]). If you have questions about a medical condition or this instruction, always ask your healthcare professional. [place] [address] any warranty or liability for your use of this information.

## 2021-07-22 ENCOUNTER — OFFICE VISIT (OUTPATIENT)
Dept: ORTHOPEDIC SURGERY | Age: 86
End: 2021-07-22
Payer: MEDICARE

## 2021-07-22 VITALS — HEIGHT: 67 IN | WEIGHT: 155 LBS | BODY MASS INDEX: 24.33 KG/M2

## 2021-07-22 DIAGNOSIS — M17.12 PRIMARY OSTEOARTHRITIS OF LEFT KNEE: Primary | ICD-10-CM

## 2021-07-22 DIAGNOSIS — M25.562 LEFT KNEE PAIN, UNSPECIFIED CHRONICITY: ICD-10-CM

## 2021-07-22 PROCEDURE — 20610 DRAIN/INJ JOINT/BURSA W/O US: CPT | Performed by: ORTHOPAEDIC SURGERY

## 2021-07-22 RX ORDER — TRIAMCINOLONE ACETONIDE 40 MG/ML
80 INJECTION, SUSPENSION INTRA-ARTICULAR; INTRAMUSCULAR ONCE
Status: COMPLETED | OUTPATIENT
Start: 2021-07-22 | End: 2021-07-23

## 2021-07-22 RX ORDER — BUPIVACAINE HYDROCHLORIDE 2.5 MG/ML
3 INJECTION, SOLUTION INFILTRATION; PERINEURAL ONCE
Status: COMPLETED | OUTPATIENT
Start: 2021-07-22 | End: 2021-07-23

## 2021-07-23 RX ADMIN — TRIAMCINOLONE ACETONIDE 80 MG: 40 INJECTION, SUSPENSION INTRA-ARTICULAR; INTRAMUSCULAR at 09:52

## 2021-07-23 RX ADMIN — BUPIVACAINE HYDROCHLORIDE 7.5 MG: 2.5 INJECTION, SOLUTION INFILTRATION; PERINEURAL at 09:51

## 2021-07-23 NOTE — PROGRESS NOTES
Chief Complaint:   Chief Complaint   Patient presents with    Knee Pain     Left knee pain x 1 year. No X-rays. Takes Tylenol for back pain which seems to help knee pain       Gregorio Ornelas presents with chronic intermittent and perhaps progressive left knee pain over the past year or so, he was seen for this in the past diagnosed with early osteoarthritis. He has been managing this with Tylenol and activity modification with pain at times does interfere with his daily activities that continue to be fairly active for his age. No other joint complaints, no fever chills sweats or other systemic symptoms. Allergies; medications; past medical, surgical, family, and social history; and problem list have been reviewed today and updated as indicated in this encounter seen below. Exam: Leg lengths equal hip motion painless, right knee unremarkable, left knee tender to palpation to medial joint line with some localized synovitis but no effusion erythema, full range of motion no laxity with medial lateral AP stress. Radiographs: Weightbearing AP x-rays of knees including lateral left today show advancement of the medial joint space narrowing with mild varus deformity sclerosis and osteophyte formation consistent with varus DJD left knee. Yuan Beckham was seen today for knee pain. Diagnoses and all orders for this visit:    Primary osteoarthritis of left knee  -     PA ARTHROCENTESIS ASPIR&/INJ MAJOR JT/BURSA W/O US    Left knee pain, unspecified chronicity  -     XR KNEE BILATERAL STANDING  -     XR KNEE LEFT (1-2 VIEWS)    Other orders  -     triamcinolone acetonide (KENALOG-40) injection 80 mg  -     bupivacaine (MARCAINE) 0.25 % injection 7.5 mg       Diagnosis and treatment alternatives were reviewed with the patient, including therapy, injection therapy or possible arthroplasty.   Questions were asked answered and understood, at the patient's request I injected left knee through an anteromedial approach with Kenalog and Marcaine tolerated well no difficulty or complication. Low-impact exercise and activity modification advised and follow-up in 6 weeks if pain persist for further discussion about possible arthroplasty. Return in about 6 weeks (around 9/2/2021). Current Outpatient Medications   Medication Sig Dispense Refill    RHOPRESSA 0.02 % SOLN INSTILL 1 DROP IN LEFT EYE EVERY DAY      dorzolamide (TRUSOPT) 2 % ophthalmic solution INSTILL 1 DROP IN LEFT EYE TWICE DAILY      amLODIPine (NORVASC) 5 MG tablet TAKE ONE TABLET BY MOUTH EVERY DAY      atorvastatin (LIPITOR) 40 MG tablet TAKE ONE-HALF TABLET BY MOUTH AT BEDTIME      methylPREDNISolone (MEDROL DOSEPACK) 4 MG tablet Take by mouth as directed 1 kit 0    aspirin 81 MG EC tablet Take 81 mg by mouth daily      LYCOPENE PO Take 20 mg by mouth daily      lisinopril (PRINIVIL;ZESTRIL) 10 MG tablet Take 10 mg by mouth daily      brimonidine (ALPHAGAN) 0.2 % ophthalmic solution 1 drop 3 times daily      Multiple Vitamins-Minerals (PRESERVISION/LUTEIN) CAPS Take 1 capsule by mouth 2 times daily       Cholecalciferol (VITAMIN D) 2000 units CAPS capsule Take 1 capsule by mouth daily       Omega-3 Fatty Acids (FISH OIL OMEGA-3 PO) Take 1,200 mg by mouth daily       Calcium Citrate-Vitamin D 200-200 MG-UNIT TABS Take 1,200 mg by mouth daily. Pill also has Vit D 800 Units Pt takes two pills a day      Lutein 40 MG CAPS Take 40 mg by mouth daily       Coenzyme Q10 100 MG CAPS Take 200 mg by mouth daily.  mesalamine (PENTASA) 250 MG CR capsule Take 250 mg by mouth Takes 6 tab three time daily      Multiple Vitamin (MULTIVITAMIN PO) Take  by mouth daily.         Bilberry 1000 MG CAPS Take 2,000 mg by mouth daily        Current Facility-Administered Medications   Medication Dose Route Frequency Provider Last Rate Last Admin    triamcinolone acetonide (KENALOG-40) injection 80 mg  80 mg Intra-articular Once Jonah Mireles MD        bupivacaine (MARCAINE) 0.25 % injection 7.5 mg  3 mL Intra-articular Once Katina Ojeda MD           Patient Active Problem List   Diagnosis    WPW (Donato-Parkinson-White syndrome) - s/p ablation    Crohn's disease ([address]    Mitral regurgitation - mild     Tricuspid regurgitation - moderate     Essential hypertension    Chest pain    Atrial tachycardia (HCC)       Past Medical History:   Diagnosis Date    Carotid artery occlusion     Chronic pain of left knee     Crohn disease ([address]     Hypertension     Mitral regurgitation     Osteopenia     Osteoporosis     Tricuspid regurgitation     WPW (Donato-Parkinson-White syndrome)     had ablation to correct       Past Surgical History:   Procedure Laterality Date    ABLATION OF DYSRHYTHMIC FOCUS  2009    wpw    COLECTOMY      x 3  Due to Crohns Disease  Age 52, Age 46 and 5   [employer] INGUINAL HERNIA REPAIR Right     Undecended testicle       Allergies   Allergen Reactions    Sumycin [Tetracycline Hcl]     Tetracyclines & Related Itching and Rash     sumyacin       Social History     Socioeconomic History    Marital status:      Spouse name: None    Number of children: None    Years of education: None    Highest education level: None   Occupational History    None   Tobacco Use    Smoking status: Passive Smoke Exposure - Never Smoker    Smokeless tobacco: Never Used    Tobacco comment: 55 year due to second hand from wife. Vaping Use    Vaping Use: Never used   Substance and Sexual Activity    Alcohol use: No    Drug use: No    Sexual activity: Not Currently   Other Topics Concern    None   Social History Narrative    None     Social Determinants of Health     Financial Resource Strain:     Difficulty of Paying Living Expenses:    Food Insecurity:     Worried About Running Out of Food in the Last Year:     Ran Out of Food in the Last Year:    Transportation Needs:     Lack of Transportation (Medical):      Lack of Transportation (Non-Medical):    Physical Activity:     Days of Exercise per Week:     Minutes of Exercise per Session:    Stress:     Feeling of Stress :    Social Connections:     Frequency of Communication with Friends and Family:     Frequency of Social Gatherings with Friends and Family:     Attends Sikh Services:     Active Member of Clubs or Organizations:     Attends Club or Organization Meetings:     Marital Status:    Intimate Partner Violence:     Fear of Current or Ex-Partner:     Emotionally Abused:     Physically Abused:     Sexually Abused:        Family History   Problem Relation Age of Onset    Heart Disease Mother     Cancer Father         Stomach Cancer         Review of Systems  As follows except as previously noted in HPI:  Constitutional: Negative for chills, diaphoresis, fatigue, fever and unexpected weight change. Respiratory: Negative for cough, shortness of breath and wheezing. Cardiovascular: Negative for chest pain and palpitations. Neurological: Negative for dizziness, syncope, cephalgia. GI / : negative  Musculoskeletal: see HPI       Objective:   Physical Exam   Constitutional: Oriented to person, place, and time. and appears well-developed and well-nourished. :   Head: Normocephalic and atraumatic. Eyes: EOM are normal.   Neck: Neck supple. Cardiovascular: Normal rate and regular rhythm. Pulmonary/Chest: Effort normal. No stridor. No respiratory distress, no wheezes. Abdominal:  No abnormal distension. Neurological: Alert and oriented to person, place, and time. Skin: Skin is warm and dry. Psychiatric: Normal mood and affect.  Behavior is normal. Thought content normal.    7/23/2021  7:40 AM

## 2021-08-05 ENCOUNTER — OFFICE VISIT (OUTPATIENT)
Dept: ORTHOPEDIC SURGERY | Age: 86
End: 2021-08-05
Payer: MEDICARE

## 2021-08-05 VITALS — BODY MASS INDEX: 24.33 KG/M2 | WEIGHT: 155 LBS | HEIGHT: 67 IN

## 2021-08-05 DIAGNOSIS — S99.911A ANKLE INJURY, RIGHT, INITIAL ENCOUNTER: Primary | ICD-10-CM

## 2021-08-05 PROCEDURE — G8427 DOCREV CUR MEDS BY ELIG CLIN: HCPCS | Performed by: ORTHOPAEDIC SURGERY

## 2021-08-05 PROCEDURE — 1036F TOBACCO NON-USER: CPT | Performed by: ORTHOPAEDIC SURGERY

## 2021-08-05 PROCEDURE — 99213 OFFICE O/P EST LOW 20 MIN: CPT | Performed by: ORTHOPAEDIC SURGERY

## 2021-08-05 PROCEDURE — 4040F PNEUMOC VAC/ADMIN/RCVD: CPT | Performed by: ORTHOPAEDIC SURGERY

## 2021-08-05 PROCEDURE — G8420 CALC BMI NORM PARAMETERS: HCPCS | Performed by: ORTHOPAEDIC SURGERY

## 2021-08-05 PROCEDURE — 1123F ACP DISCUSS/DSCN MKR DOCD: CPT | Performed by: ORTHOPAEDIC SURGERY

## 2021-08-06 NOTE — PROGRESS NOTES
Chief Complaint:   Chief Complaint   Patient presents with    Ankle Injury     Rt ankle injury. Was weed wacking when he hit a stone into medial right ankle 8/2/2021. Used ice. Chadwick Perez presents with acute medial right ankle pain swelling and ecchymosis, was struck by an object while he was using a weed parminder, he was able to bear weight but had pain, has been icing it and is slowly feeling better, ambulating in a regular shoe. Not taking any anticoagulants other than aspirin every other day at this time. No other joint complaints. No previous problems with this ankle. Allergies; medications; past medical, surgical, family, and social history; and problem list have been reviewed today and updated as indicated in this encounter seen below. Exam: Isolated finding seen at the right ankle where there is diffuse ecchymosis Around and distal to the medial malleolus extending down along the medial side of the foot, ankle is well aligned with good range of motion no, there is exquisite tenderness palpation over the medial malleolus itself. No effusion appreciated. Radiographs: Three-view x-rays of the right ankle show no evident fracture, ankle mortise is well aligned talus is centered, syndesmosis is intact. Emanuel Roa was seen today for ankle injury. Diagnoses and all orders for this visit:    Ankle injury, right, initial encounter  -     XR ANKLE RIGHT (MIN 3 VIEWS); Future       Impression is blunt force trauma contusion medial right ankle. Patient may pursue activities to tolerance with expectant observation for resolution of the symptoms in the coming weeks, if pain persist he will come back for further assessment possible advanced imaging. Return if symptoms worsen or fail to improve.      Current Outpatient Medications   Medication Sig Dispense Refill    RHOPRESSA 0.02 % SOLN INSTILL 1 DROP IN LEFT EYE EVERY DAY      dorzolamide (TRUSOPT) 2 % ophthalmic solution INSTILL 1 DROP IN Date    ABLATION OF DYSRHYTHMIC FOCUS  2009    wpw    COLECTOMY      x 3  Due to Crohns Disease  Age 52, Age 46 and 5    INGUINAL HERNIA REPAIR Right     Undecended testicle       Allergies   Allergen Reactions    Sumycin [Tetracycline Hcl]     Tetracyclines & Related Itching and Rash     sumyacin       Social History     Socioeconomic History    Marital status:      Spouse name: None    Number of children: None    Years of education: None    Highest education level: None   Occupational History    None   Tobacco Use    Smoking status: Passive Smoke Exposure - Never Smoker    Smokeless tobacco: Never Used    Tobacco comment: 55 year due to second hand from wife. Vaping Use    Vaping Use: Never used   Substance and Sexual Activity    Alcohol use: No    Drug use: No    Sexual activity: Not Currently   Other Topics Concern    None   Social History Narrative    None     Social Determinants of Health     Financial Resource Strain:     Difficulty of Paying Living Expenses:    Food Insecurity:     Worried About Running Out of Food in the Last Year:     Ran Out of Food in the Last Year:    Transportation Needs:     Lack of Transportation (Medical):      Lack of Transportation (Non-Medical):    Physical Activity:     Days of Exercise per Week:     Minutes of Exercise per Session:    Stress:     Feeling of Stress :    Social Connections:     Frequency of Communication with Friends and Family:     Frequency of Social Gatherings with Friends and Family:     Attends Confucianism Services:     Active Member of Clubs or Organizations:     Attends Club or Organization Meetings:     Marital Status:    Intimate Partner Violence:     Fear of Current or Ex-Partner:     Emotionally Abused:     Physically Abused:     Sexually Abused:        Family History   Problem Relation Age of Onset    Heart Disease Mother     Cancer Father         Stomach Cancer         Review of Systems  As follows except as previously noted in HPI:  Constitutional: Negative for chills, diaphoresis, fatigue, fever and unexpected weight change. Respiratory: Negative for cough, shortness of breath and wheezing. Cardiovascular: Negative for chest pain and palpitations. Neurological: Negative for dizziness, syncope, cephalgia. GI / : negative  Musculoskeletal: see HPI       Objective:   Physical Exam   Constitutional: Oriented to person, place, and time. and appears well-developed and well-nourished. :   Head: Normocephalic and atraumatic. Eyes: EOM are normal.   Neck: Neck supple. Cardiovascular: Normal rate and regular rhythm. Pulmonary/Chest: Effort normal. No stridor. No respiratory distress, no wheezes. Abdominal:  No abnormal distension. Neurological: Alert and oriented to person, place, and time. Skin: Skin is warm and dry. Psychiatric: Normal mood and affect.  Behavior is normal. Thought content normal.    8/5/2021  8:30 PM

## 2021-09-15 ENCOUNTER — OFFICE VISIT (OUTPATIENT)
Dept: PHYSICAL MEDICINE AND REHAB | Age: 86
End: 2021-09-15
Payer: MEDICARE

## 2021-09-15 VITALS
BODY MASS INDEX: 24.75 KG/M2 | HEART RATE: 98 BPM | DIASTOLIC BLOOD PRESSURE: 73 MMHG | SYSTOLIC BLOOD PRESSURE: 157 MMHG | WEIGHT: 154 LBS | HEIGHT: 66 IN

## 2021-09-15 DIAGNOSIS — G89.29 CHRONIC RIGHT-SIDED LOW BACK PAIN WITH RIGHT-SIDED SCIATICA: ICD-10-CM

## 2021-09-15 DIAGNOSIS — M47.816 LUMBAR SPONDYLOSIS: ICD-10-CM

## 2021-09-15 DIAGNOSIS — M54.17 RADICULOPATHY, LUMBOSACRAL REGION: ICD-10-CM

## 2021-09-15 DIAGNOSIS — M54.17 LUMBOSACRAL RADICULITIS: Primary | ICD-10-CM

## 2021-09-15 DIAGNOSIS — M54.41 CHRONIC RIGHT-SIDED LOW BACK PAIN WITH RIGHT-SIDED SCIATICA: ICD-10-CM

## 2021-09-15 PROCEDURE — 4040F PNEUMOC VAC/ADMIN/RCVD: CPT | Performed by: PHYSICAL MEDICINE & REHABILITATION

## 2021-09-15 PROCEDURE — 1123F ACP DISCUSS/DSCN MKR DOCD: CPT | Performed by: PHYSICAL MEDICINE & REHABILITATION

## 2021-09-15 PROCEDURE — 99214 OFFICE O/P EST MOD 30 MIN: CPT | Performed by: PHYSICAL MEDICINE & REHABILITATION

## 2021-09-15 PROCEDURE — G8427 DOCREV CUR MEDS BY ELIG CLIN: HCPCS | Performed by: PHYSICAL MEDICINE & REHABILITATION

## 2021-09-15 PROCEDURE — 1036F TOBACCO NON-USER: CPT | Performed by: PHYSICAL MEDICINE & REHABILITATION

## 2021-09-15 PROCEDURE — G8420 CALC BMI NORM PARAMETERS: HCPCS | Performed by: PHYSICAL MEDICINE & REHABILITATION

## 2021-09-15 NOTE — PROGRESS NOTES
Connie Jessica, [id number] [facility]  [address]  Phone: [phone]  Fax: [phone]    PCP: Charisse Quijano MD  Date of visit: 9/15/21    Chief Complaint   Patient presents with    Back Pain     follow up want to discuss epidurals     Interval:   Patient presents today for office visit to discuss epidural steroid injections. We discussed them last visit and he wanted to think about it. He has tried PT, medications without any relief. The pain is rated Pain Score:   8, is described as achy and sometimes burning located in right low back with radiation into right leg. The pain is better with rest.  The pain is worse with standing and walking. There is no associated numbness/tingling. There is weakness in both legs. There is no bowel/bladder changes. The prior workup has included: MRI L Spine     The prior treatment has included:  PT: yes. Chiropractic: none    Modalities: none   OTC Tylenol: yes with relief   NSAIDS: occasional ibuprofen.      Opioids: none    Membrane stabilizers: none   Muscle relaxers: none   Previous injections: none    Previous surgery at this site: none    Allergies   Allergen Reactions    Sumycin [Tetracycline Hcl]     Tetracyclines & Related Itching and Rash     sumyacin       Current Outpatient Medications   Medication Sig Dispense Refill    RHOPRESSA 0.02 % SOLN INSTILL 1 DROP IN LEFT EYE EVERY DAY      dorzolamide (TRUSOPT) 2 % ophthalmic solution INSTILL 1 DROP IN LEFT EYE TWICE DAILY      amLODIPine (NORVASC) 5 MG tablet TAKE ONE TABLET BY MOUTH EVERY DAY      atorvastatin (LIPITOR) 40 MG tablet TAKE ONE-HALF TABLET BY MOUTH AT BEDTIME      LYCOPENE PO Take 20 mg by mouth daily      lisinopril (PRINIVIL;ZESTRIL) 10 MG tablet Take 10 mg by mouth daily      brimonidine (ALPHAGAN) 0.2 % ophthalmic solution 1 drop 3 times daily      Multiple Vitamins-Minerals (PRESERVISION/LUTEIN) CAPS Take 1 capsule by mouth 2 times daily       Cholecalciferol (VITAMIN D) 2000 units CAPS capsule Take 1 capsule by mouth daily       Calcium Citrate-Vitamin D 200-200 MG-UNIT TABS Take 1,200 mg by mouth daily. Pill also has Vit D 800 Units Pt takes two pills a day      Lutein 40 MG CAPS Take 40 mg by mouth daily       Coenzyme Q10 100 MG CAPS Take 200 mg by mouth daily.  mesalamine (PENTASA) 250 MG CR capsule Take 250 mg by mouth Takes 6 tab three time daily      Multiple Vitamin (MULTIVITAMIN PO) Take  by mouth daily.  Bilberry 1000 MG CAPS Take 2,000 mg by mouth daily       aspirin 81 MG EC tablet Take 81 mg by mouth daily      Omega-3 Fatty Acids (FISH OIL OMEGA-3 PO) Take 1,200 mg by mouth daily        No current facility-administered medications for this visit. Past Medical History:   Diagnosis Date    Carotid artery occlusion     Chronic pain of left knee     Crohn disease (HCC)     Hypertension     Mitral regurgitation     Osteopenia     Osteoporosis     Tricuspid regurgitation     WPW (Donato-Parkinson-White syndrome)     had ablation to correct       Past Surgical History:   Procedure Laterality Date    ABLATION OF DYSRHYTHMIC FOCUS  2009    wpw    COLECTOMY      x 3  Due to Crohns Disease  Age 52, Age 46 and 5   Zannie Cowden INGUINAL HERNIA REPAIR Right     Undecended testicle       Family History   Problem Relation Age of Onset    Heart Disease Mother     Cancer Father         Stomach Cancer       Social History     Tobacco Use    Smoking status: Passive Smoke Exposure - Never Smoker    Smokeless tobacco: Never Used    Tobacco comment: 54 year due to second hand from wife. Vaping Use    Vaping Use: Never used   Substance Use Topics    Alcohol use: No    Drug use: No          Functional Status: The patient is able to ambulate and perform activities of daily living without the use of an assistive device.       Occupation: The patient is currently retired       ROS: Constitutional: Denies fevers, chills, night sweats, unintentional weight loss     Skin: Denies rash or skin changes     Eyes: Denies vision changes    Ears/Nose/Throat: Denies nasal congestion or sore throat     Respiratory: Denies SOB or cough     Cardiovascular: Denies CP, palpitations, edema      Gastrointestinal: Denies abdominal pain,  N/V, constipation, or diarrhea    Genitourinary: Denies urinary symptoms    Neurologic: See HPI.     MSK: See HPI. Psychiatric: Denies sleep disturbance, anxiety, depression    Hematologic/Lymphatic/Immunologic: +Easy bruising       Physical Exam:   Blood pressure (!) 157/73, pulse 98, height 5' 6\" (1.676 m), weight 154 lb (69.9 kg). General: well developed and well nourished in no acute distress. Body habitus is thin  HEENT: No rhinorrhea, sneezing, yawning, or lacrimation. No scleral icterus or conjunctival injection. Resp: symmetrical chest expansion, unlabored breathing, respirations unlabored. CV: Heart rate is regular. Peripheral pulses are palpable  Lymph: No visible regional lymphadenopathy. Skin: No rashes or ecchymosis. Normal turgor. Psych: Mood is calm. Affect is normal.   Ext: No edema noted     MSK:   Back/Hip Exam:   Inspection: Pelvis was symmetric. Lumbar lordotic curvature was decreased. There was no scoliosis. No ecchymoses or erythema. Palpation: Palpatory exam revealed no tenderness along lumbosacral paraspinals, midline spine, SI joint sulcus, greater trochanters and TFL on both side. TTP right glute. There was no paraspinal spasms. There were no trigger points.     ROM: ROM decreased   Special/provocative testing:   Seating SLR negative.      Neurological Exam:  Strength:                     R          L  Hip Flex                       5          5  Knee Ext                     5          5  Ankle dorsi                  4          4  EHL                             4          4  Ankle Plantar               5          5     Sensory:  Intact for light touch in all lower extremity dermatomes.      Reflexes:                     R          L  Patellar                        (1+)      (1+)  Ankle Jerk                   (0)        (0)        Gai: narrow based, externally rotated feet. Forward flexed at hips, slightly unsteady    Imaging:   MRI L spine    Impression:   Julia Jiang is a 80 y.o. male    1. Lumbosacral radiculitis    2. Lumbar spondylosis    3. Chronic right-sided low back pain with right-sided sciatica    4. Radiculopathy, lumbosacral region        Plan:   · MRI was reviewed again. · Will proceed with right L4-5 and L5-S1 TFESI. Procedure risks, benefits and alternatives were discussed. Patient would like to proceed. · Continue HEP      The patient was educated about the diagnosis, prognosis, indications, risks and benefits of treatment. An opportunity to ask questions was given to the patient and questions were answered. The patient agreed to proceed with the recommended treatment as described above.      Follow up after injection     Randall Allen DO [facility]   Board Certified Physical Medicine and Rehabilitation

## 2021-09-15 NOTE — H&P
[employer], [id number] [facility] Physical Medicine and Rehabilitation  [address] [facility] [address]  Phone: [phone]  Fax: [phone]    PCP: Salvador Buenrostro MD  Date of visit: 9/15/21    Chief Complaint   Patient presents with    Back Pain     follow up want to discuss epidurals     Interval:   Patient presents today for office visit to discuss epidural steroid injections. We discussed them last visit and he wanted to think about it. He has tried PT, medications without any relief. The pain is rated Pain Score:   8, is described as achy and sometimes burning located in right low back with radiation into right leg. The pain is better with rest.  The pain is worse with standing and walking. There is no associated numbness/tingling. There is weakness in both legs. There is no bowel/bladder changes. The prior workup has included: MRI L Spine     The prior treatment has included:  PT: yes. Chiropractic: none    Modalities: none   OTC Tylenol: yes with relief   NSAIDS: occasional ibuprofen.      Opioids: none    Membrane stabilizers: none   Muscle relaxers: none   Previous injections: none    Previous surgery at this site: none    Allergies   Allergen Reactions    Sumycin [Tetracycline Hcl]     Tetracyclines & Related Itching and Rash     sumyacin       Current Outpatient Medications   Medication Sig Dispense Refill    RHOPRESSA 0.02 % SOLN INSTILL 1 DROP IN LEFT EYE EVERY DAY      dorzolamide (TRUSOPT) 2 % ophthalmic solution INSTILL 1 DROP IN LEFT EYE TWICE DAILY      amLODIPine (NORVASC) 5 MG tablet TAKE ONE TABLET BY MOUTH EVERY DAY      atorvastatin (LIPITOR) 40 MG tablet TAKE ONE-HALF TABLET BY MOUTH AT BEDTIME      LYCOPENE PO Take 20 mg by mouth daily      lisinopril (PRINIVIL;ZESTRIL) 10 MG tablet Take 10 mg by mouth daily      brimonidine (ALPHAGAN) 0.2 % ophthalmic solution 1 drop 3 times daily      Multiple Vitamins-Minerals (PRESERVISION/LUTEIN) CAPS Take 1 capsule by mouth 2 times daily       Cholecalciferol (VITAMIN D) 2000 units CAPS capsule Take 1 capsule by mouth daily       Calcium Citrate-Vitamin D 200-200 MG-UNIT TABS Take 1,200 mg by mouth daily. Pill also has Vit D 800 Units Pt takes two pills a day      Lutein 40 MG CAPS Take 40 mg by mouth daily       Coenzyme Q10 100 MG CAPS Take 200 mg by mouth daily.  mesalamine (PENTASA) 250 MG CR capsule Take 250 mg by mouth Takes 6 tab three time daily      Multiple Vitamin (MULTIVITAMIN PO) Take  by mouth daily.  Bilberry 1000 MG CAPS Take 2,000 mg by mouth daily       aspirin 81 MG EC tablet Take 81 mg by mouth daily      Omega-3 Fatty Acids (FISH OIL OMEGA-3 PO) Take 1,200 mg by mouth daily        No current facility-administered medications for this visit. Past Medical History:   Diagnosis Date    Carotid artery occlusion     Chronic pain of left knee     Crohn disease (HCC)     Hypertension     Mitral regurgitation     Osteopenia     Osteoporosis     Tricuspid regurgitation     WPW (Donato-Parkinson-White syndrome)     had ablation to correct       Past Surgical History:   Procedure Laterality Date    ABLATION OF DYSRHYTHMIC FOCUS  2009    wpw    COLECTOMY      x 3  Due to Crohns Disease  Age 52, Age 46 and OSMOTHERLEY   Everlene Harper INGUINAL HERNIA REPAIR Right     Undecended testicle       Family History   Problem Relation Age of Onset    Heart Disease Mother     Cancer Father         Stomach Cancer       Social History     Tobacco Use    Smoking status: Passive Smoke Exposure - Never Smoker    Smokeless tobacco: Never Used    Tobacco comment: 54 year due to second hand from wife. Vaping Use    Vaping Use: Never used   Substance Use Topics    Alcohol use: No    Drug use: No          Functional Status: The patient is able to ambulate and perform activities of daily living without the use of an assistive device.       Occupation: The patient is currently retired       ROS: Constitutional: Denies fevers, chills, night sweats, unintentional weight loss     Skin: Denies rash or skin changes     Eyes: Denies vision changes    Ears/Nose/Throat: Denies nasal congestion or sore throat     Respiratory: Denies SOB or cough     Cardiovascular: Denies CP, palpitations, edema      Gastrointestinal: Denies abdominal pain,  N/V, constipation, or diarrhea    Genitourinary: Denies urinary symptoms    Neurologic: See HPI.     MSK: See HPI. Psychiatric: Denies sleep disturbance, anxiety, depression    Hematologic/Lymphatic/Immunologic: +Easy bruising       Physical Exam:   Blood pressure (!) 157/73, pulse 98, height 5' 6\" (1.676 m), weight 154 lb (69.9 kg). General: well developed and well nourished in no acute distress. Body habitus is thin  HEENT: No rhinorrhea, sneezing, yawning, or lacrimation. No scleral icterus or conjunctival injection. Resp: symmetrical chest expansion, unlabored breathing, respirations unlabored. CV: Heart rate is regular. Peripheral pulses are palpable  Lymph: No visible regional lymphadenopathy. Skin: No rashes or ecchymosis. Normal turgor. Psych: Mood is calm. Affect is normal.   Ext: No edema noted     MSK:   Back/Hip Exam:   Inspection: Pelvis was symmetric. Lumbar lordotic curvature was decreased. There was no scoliosis. No ecchymoses or erythema. Palpation: Palpatory exam revealed no tenderness along lumbosacral paraspinals, midline spine, SI joint sulcus, greater trochanters and TFL on both side. TTP right glute. There was no paraspinal spasms. There were no trigger points.     ROM: ROM decreased   Special/provocative testing:   Seating SLR negative.      Neurological Exam:  Strength:                     R          L  Hip Flex                       5          5  Knee Ext                     5          5  Ankle dorsi                  4          4  EHL                             4          4  Ankle Plantar               5          5     Sensory:  Intact for light touch in all lower extremity dermatomes.      Reflexes:                     R          L  Patellar                        (1+)      (1+)  Ankle Jerk                   (0)        (0)        Gai: narrow based, externally rotated feet. Forward flexed at hips, slightly unsteady    Imaging:   MRI L spine    Impression:   Josiane Alexander is a 80 y.o. male    1. Lumbosacral radiculitis    2. Lumbar spondylosis    3. Chronic right-sided low back pain with right-sided sciatica    4. Radiculopathy, lumbosacral region        Plan:   · MRI was reviewed again. · Will proceed with right L4-5 and L5-S1 TFESI. Procedure risks, benefits and alternatives were discussed. Patient would like to proceed. · Continue HEP      The patient was educated about the diagnosis, prognosis, indications, risks and benefits of treatment. An opportunity to ask questions was given to the patient and questions were answered. The patient agreed to proceed with the recommended treatment as described above.      Follow up after injection     Marybeth Llanes DO, [facility]   Board Certified Physical Medicine and Rehabilitation

## 2021-09-21 ENCOUNTER — OFFICE VISIT (OUTPATIENT)
Dept: CARDIOLOGY CLINIC | Age: 86
End: 2021-09-21
Payer: MEDICARE

## 2021-09-21 VITALS
RESPIRATION RATE: 16 BRPM | DIASTOLIC BLOOD PRESSURE: 70 MMHG | HEIGHT: 67 IN | HEART RATE: 76 BPM | BODY MASS INDEX: 24.25 KG/M2 | SYSTOLIC BLOOD PRESSURE: 118 MMHG | WEIGHT: 154.5 LBS

## 2021-09-21 DIAGNOSIS — I45.6 WPW (WOLFF-PARKINSON-WHITE SYNDROME): ICD-10-CM

## 2021-09-21 DIAGNOSIS — I34.0 NONRHEUMATIC MITRAL VALVE REGURGITATION: ICD-10-CM

## 2021-09-21 DIAGNOSIS — I10 ESSENTIAL HYPERTENSION: Primary | ICD-10-CM

## 2021-09-21 DIAGNOSIS — I36.1 NONRHEUMATIC TRICUSPID VALVE REGURGITATION: ICD-10-CM

## 2021-09-21 PROCEDURE — 1036F TOBACCO NON-USER: CPT | Performed by: INTERNAL MEDICINE

## 2021-09-21 PROCEDURE — G8420 CALC BMI NORM PARAMETERS: HCPCS | Performed by: INTERNAL MEDICINE

## 2021-09-21 PROCEDURE — 93000 ELECTROCARDIOGRAM COMPLETE: CPT | Performed by: INTERNAL MEDICINE

## 2021-09-21 PROCEDURE — 99213 OFFICE O/P EST LOW 20 MIN: CPT | Performed by: INTERNAL MEDICINE

## 2021-09-21 PROCEDURE — G8427 DOCREV CUR MEDS BY ELIG CLIN: HCPCS | Performed by: INTERNAL MEDICINE

## 2021-09-21 PROCEDURE — 1123F ACP DISCUSS/DSCN MKR DOCD: CPT | Performed by: INTERNAL MEDICINE

## 2021-09-21 PROCEDURE — 4040F PNEUMOC VAC/ADMIN/RCVD: CPT | Performed by: INTERNAL MEDICINE

## 2021-09-21 NOTE — PROGRESS NOTES
Patient Active Problem List   Diagnosis    WPW (Donato-Parkinson-White syndrome) - s/p ablation    [address]'s disease ([facility] [address]    Mitral regurgitation - mild     Tricuspid regurgitation - moderate     Essential hypertension    Chest pain    Atrial tachycardia (HCC)       Current Outpatient Medications   Medication Sig Dispense Refill    RHOPRESSA 0.02 % SOLN INSTILL 1 DROP IN LEFT EYE EVERY DAY      amLODIPine (NORVASC) 5 MG tablet TAKE ONE TABLET BY MOUTH EVERY DAY      atorvastatin (LIPITOR) 40 MG tablet TAKE ONE-HALF TABLET BY MOUTH AT BEDTIME      LYCOPENE PO Take 20 mg by mouth daily      lisinopril (PRINIVIL;ZESTRIL) 10 MG tablet Take 10 mg by mouth daily      brimonidine (ALPHAGAN) 0.2 % ophthalmic solution 1 drop 3 times daily      Multiple Vitamins-Minerals (PRESERVISION/LUTEIN) CAPS Take 1 capsule by mouth 2 times daily       Cholecalciferol (VITAMIN D) 2000 units CAPS capsule Take 1 capsule by mouth daily       Calcium Citrate-Vitamin D 200-200 MG-UNIT TABS Take 1,200 mg by mouth daily. Pill also has Vit D 800 Units Pt takes two pills a day      Lutein 40 MG CAPS Take 40 mg by mouth daily       Coenzyme Q10 100 MG CAPS Take 200 mg by mouth daily.  mesalamine (PENTASA) 250 MG CR capsule Take 250 mg by mouth Takes 6 tab three time daily      Multiple Vitamin (MULTIVITAMIN PO) Take  by mouth daily.  Bilberry 1000 MG CAPS Take 2,000 mg by mouth daily       dorzolamide (TRUSOPT) 2 % ophthalmic solution INSTILL 1 DROP IN LEFT EYE TWICE DAILY (Patient not taking: Reported on 9/21/2021)       No current facility-administered medications for this visit. CC:    Patient is seen in follow up for:  1. Essential hypertension    2. WPW (Donato-Parkinson-White syndrome) - s/p ablation    3. Nonrheumatic mitral valve regurgitation - mild    4. Nonrheumatic tricuspid valve regurgitation - moderate        HPI:  Patient is doing well without any specific cardiac problems.   Patient denies any shortness of breath, chest pain, lightheadedness or dizziness. Patient is tolerating medications well without side effects. Walks daily without any difficulties. ROS:   General: No unusual weight gain, no change in exercise tolerance  Skin: No rash or itching  EENT: No vision changes or nosebleeds  Cardiovascular: No orthopnea or paroxysmal nocturnal dyspnea  Respiratory: No cough or hemoptysis  Gastrointestinal: No hematemesis or recent changes in bowel habits  Genitourinary: No hematuria, urgency or frequency  Musculoskeletal: No muscular weakness or joint swelling   Neurologic / Psychiatric: No incoordination or convulsions  Allergic / Immunologic/ Lymphatic / Endocrine: No anemia or bleeding tendency        Social History     Socioeconomic History    Marital status:      Spouse name: Not on file    Number of children: Not on file    Years of education: Not on file    Highest education level: Not on file   Occupational History    Not on file   Tobacco Use    Smoking status: Passive Smoke Exposure - Never Smoker    Smokeless tobacco: Never Used    Tobacco comment: 55 year due to second hand from wife. Vaping Use    Vaping Use: Never used   Substance and Sexual Activity    Alcohol use: No    Drug use: No    Sexual activity: Not Currently   Other Topics Concern    Not on file   Social History Narrative    Not on file     Social Determinants of Health     Financial Resource Strain:     Difficulty of Paying Living Expenses:    Food Insecurity:     Worried About Running Out of Food in the Last Year:     [address] in the Last Year:    Transportation Needs:     Lack of Transportation (Medical):      Lack of Transportation (Non-Medical):    Physical Activity:     Days of Exercise per Week:     Minutes of Exercise per Session:    Stress:     Feeling of Stress :    Social Connections:     Frequency of Communication with Friends and Family:     Frequency of Social Gatherings with Friends and Family:     Attends Hinduism Services:     Active Member of Clubs or Organizations:     Attends Club or Organization Meetings:     Marital Status:    Intimate Partner Violence:     Fear of Current or Ex-Partner:     Emotionally Abused:     Physically Abused:     Sexually Abused:        Past Medical History:   Diagnosis Date    Carotid artery occlusion     Chronic pain of left knee     [facility] disease ([facility] [address]     Hypertension     Mitral regurgitation     Osteopenia     Osteoporosis     Tricuspid regurgitation     WPW (Donato-Parkinson-White syndrome)     had ablation to correct       PHYSICAL EXAM:  CONSTITUTIONAL:  Well developed, well nourished    Vitals:    09/21/21 1251   BP: 118/70   Pulse: 76   Resp: 16   Weight: 154 lb 8 oz (70.1 kg)   Height: 5' 7\" (1.702 m)     HEAD & FACE: Normocephalic. Symmetric. EYES: No xanthelasma. Conjunctivae not injected. EARS, NOSE, MOUTH & THROAT: Good dentition. No oral pallor or cyanosis. NECK: No JVD at 30 degrees. No thyromegaly. RESPIRATORY: Clear to auscultation and percussion in all fields. No use of accessory muscle or intercostal retractions. CARDIOVASCULAR: Regular rate and rhythm. No lifts or thrills on palpitation. Auscultation with normal S1-S2 in intensity and splitting. No carotid bruits. Abdominal aorta not enlarged. Femoral arteries without bruits. Pedal pulses 2+. No edema. ABDOMEN: Soft without hepatic or splenic enlargement. No tenderness. MUSCULOSKELETAL: No kyphosis or scoliosis of the back. Good muscle strength and tone. No muscle atrophy. Normal gait and ability to undergo exercise stress testing. EXTREMITIES: No clubbing or cyanosis. SKIN: No Xanthomas or ulcerations. NEUROLOGIC: Oriented to time, place and person. Normal mood and affect. LYMPHATIC:  No palpable neck or supraclavicular nodes. No splenomegaly. EKG: Normal sinus rhythm. Short NV No change compared to prior tracing.  QTC

## 2021-10-14 ENCOUNTER — HOSPITAL ENCOUNTER (OUTPATIENT)
Age: 86
Setting detail: OUTPATIENT SURGERY
Discharge: HOME | End: 2021-10-14
Attending: PHYSICAL MEDICINE & REHABILITATION | Admitting: PHYSICAL MEDICINE & REHABILITATION
Payer: MEDICARE

## 2021-10-14 ENCOUNTER — HOSPITAL ENCOUNTER (OUTPATIENT)
Dept: OPERATING ROOM | Age: 86
Setting detail: OUTPATIENT SURGERY
Discharge: HOME | End: 2021-10-14
Attending: PHYSICAL MEDICINE & REHABILITATION
Payer: MEDICARE

## 2021-10-14 VITALS
DIASTOLIC BLOOD PRESSURE: 62 MMHG | WEIGHT: 154 LBS | RESPIRATION RATE: 14 BRPM | SYSTOLIC BLOOD PRESSURE: 136 MMHG | OXYGEN SATURATION: 95 % | BODY MASS INDEX: 24.17 KG/M2 | HEART RATE: 86 BPM | HEIGHT: 67 IN

## 2021-10-14 DIAGNOSIS — M51.9 LUMBAR DISC DISEASE: ICD-10-CM

## 2021-10-14 PROBLEM — M54.16 LUMBAR RADICULOPATHY: Status: ACTIVE | Noted: 2021-10-14

## 2021-10-14 PROCEDURE — 2709999900 HC NON-CHARGEABLE SUPPLY: Performed by: PHYSICAL MEDICINE & REHABILITATION

## 2021-10-14 PROCEDURE — 2500000003 HC RX 250 WO HCPCS: Performed by: PHYSICAL MEDICINE & REHABILITATION

## 2021-10-14 PROCEDURE — 64484 NJX AA&/STRD TFRM EPI L/S EA: CPT | Performed by: PHYSICAL MEDICINE & REHABILITATION

## 2021-10-14 PROCEDURE — 64483 NJX AA&/STRD TFRM EPI L/S 1: CPT | Performed by: PHYSICAL MEDICINE & REHABILITATION

## 2021-10-14 PROCEDURE — 3209999900 FLUORO FOR SURGICAL PROCEDURES

## 2021-10-14 PROCEDURE — 2580000003 HC RX 258: Performed by: PHYSICAL MEDICINE & REHABILITATION

## 2021-10-14 PROCEDURE — 7100000010 HC PHASE II RECOVERY - FIRST 15 MIN: Performed by: PHYSICAL MEDICINE & REHABILITATION

## 2021-10-14 PROCEDURE — 3600000005 HC SURGERY LEVEL 5 BASE: Performed by: PHYSICAL MEDICINE & REHABILITATION

## 2021-10-14 PROCEDURE — 6360000002 HC RX W HCPCS: Performed by: PHYSICAL MEDICINE & REHABILITATION

## 2021-10-14 PROCEDURE — 7100000011 HC PHASE II RECOVERY - ADDTL 15 MIN: Performed by: PHYSICAL MEDICINE & REHABILITATION

## 2021-10-14 PROCEDURE — 6360000004 HC RX CONTRAST MEDICATION: Performed by: PHYSICAL MEDICINE & REHABILITATION

## 2021-10-14 RX ORDER — LIDOCAINE HYDROCHLORIDE 10 MG/ML
INJECTION, SOLUTION EPIDURAL; INFILTRATION; INTRACAUDAL; PERINEURAL PRN
Status: DISCONTINUED | OUTPATIENT
Start: 2021-10-14 | End: 2021-10-14 | Stop reason: ALTCHOICE

## 2021-10-14 ASSESSMENT — PAIN SCALES - GENERAL
PAINLEVEL_OUTOF10: 0
PAINLEVEL_OUTOF10: 0

## 2021-10-14 ASSESSMENT — PAIN - FUNCTIONAL ASSESSMENT: PAIN_FUNCTIONAL_ASSESSMENT: 0-10

## 2021-10-14 ASSESSMENT — PAIN DESCRIPTION - DESCRIPTORS: DESCRIPTORS: ACHING;SHARP

## 2021-10-14 NOTE — OP NOTE
this level. The contrast was observed to flow under the pedicle. Radiographs were obtained for documentation purposes. After attaining flow of contrast documented above, the above injectate was administered into the transforaminal epidural space. The patient tolerated the procedure well and was discharged after an appropriate period of observation. If there are any complications, the patient was instructed to call us. The patient is to follow-up with the requesting physician after the injection, preferably within three weeks.

## 2021-10-14 NOTE — H&P
[employer], [id number] [facility] Physical Medicine and Rehabilitation  [address] [facility] [address]  Phone: [phone]  Fax: [phone]     PCP: Maurice Mcintosh MD  Date of visit: 9/15/21          Chief Complaint   Patient presents with    Back Pain       follow up want to discuss epidurals      Interval:   Patient presents today for office visit to discuss epidural steroid injections. We discussed them last visit and he wanted to think about it. He has tried PT, medications without any relief. The pain is rated Pain Score:   8, is described as achy and sometimes burning located in right low back with radiation into right leg. The pain is better with rest.  The pain is worse with standing and walking. There is no associated numbness/tingling. There is weakness in both legs. There is no bowel/bladder changes.      The prior workup has included: MRI L Spine      The prior treatment has included:  PT: yes. Chiropractic: none    Modalities: none   OTC Tylenol: yes with relief   NSAIDS: occasional ibuprofen.      Opioids: none    Membrane stabilizers: none   Muscle relaxers: none   Previous injections: none    Previous surgery at this site: none           Allergies   Allergen Reactions    Sumycin [Tetracycline Hcl]      Tetracyclines & Related Itching and Rash       sumyacin                Current Outpatient Medications   Medication Sig Dispense Refill    RHOPRESSA 0.02 % SOLN INSTILL 1 DROP IN LEFT EYE EVERY DAY        dorzolamide (TRUSOPT) 2 % ophthalmic solution INSTILL 1 DROP IN LEFT EYE TWICE DAILY        amLODIPine (NORVASC) 5 MG tablet TAKE ONE TABLET BY MOUTH EVERY DAY        atorvastatin (LIPITOR) 40 MG tablet TAKE ONE-HALF TABLET BY MOUTH AT BEDTIME        LYCOPENE PO Take 20 mg by mouth daily        lisinopril (PRINIVIL;ZESTRIL) 10 MG tablet Take 10 mg by mouth daily        brimonidine (ALPHAGAN) 0.2 % ophthalmic solution 1 drop 3 times daily        Multiple Vitamins-Minerals (PRESERVISION/LUTEIN) CAPS Take 1 capsule by mouth 2 times daily         Cholecalciferol (VITAMIN D) 2000 units CAPS capsule Take 1 capsule by mouth daily         Calcium Citrate-Vitamin D 200-200 MG-UNIT TABS Take 1,200 mg by mouth daily. Pill also has Vit D 800 Units Pt takes two pills a day        Lutein 40 MG CAPS Take 40 mg by mouth daily         Coenzyme Q10 100 MG CAPS Take 200 mg by mouth daily.        mesalamine (PENTASA) 250 MG CR capsule Take 250 mg by mouth Takes 6 tab three time daily        Multiple Vitamin (MULTIVITAMIN PO) Take  by mouth daily.          Bilberry 1000 MG CAPS Take 2,000 mg by mouth daily         aspirin 81 MG EC tablet Take 81 mg by mouth daily        Omega-3 Fatty Acids (FISH OIL OMEGA-3 PO) Take 1,200 mg by mouth daily           No current facility-administered medications for this visit.         Past Medical History:   Diagnosis Date    Carotid artery occlusion      Chronic pain of left knee      Crohn disease (HCC)      Hypertension      Mitral regurgitation      Osteopenia      Osteoporosis      Tricuspid regurgitation      WPW (Donato-Parkinson-White syndrome)       had ablation to correct               Past Surgical History:   Procedure Laterality Date    ABLATION OF DYSRHYTHMIC FOCUS   2009     wpw    COLECTOMY         x 3  Due to Crohns Disease  Age 52, Age 46 and 26    INGUINAL HERNIA REPAIR Right       Undecended testicle               Family History   Problem Relation Age of Onset    Heart Disease Mother      Cancer Father           Stomach Cancer         Social History           Tobacco Use    Smoking status: Passive Smoke Exposure - Never Smoker    Smokeless tobacco: Never Used    Tobacco comment: 54 year due to second hand from wife. Vaping Use    Vaping Use: Never used   Substance Use Topics    Alcohol use: No    Drug use: No            Functional Status:    The patient is able to ambulate and perform activities of daily living without the use of an assistive device.       Occupation: The patient is currently retired       ROS:    Constitutional: Denies fevers, chills, night sweats, unintentional weight loss     Skin: Denies rash or skin changes     Eyes: Denies vision changes    Ears/Nose/Throat: Denies nasal congestion or sore throat     Respiratory: Denies SOB or cough     Cardiovascular: Denies CP, palpitations, edema      Gastrointestinal: Denies abdominal pain,  N/V, constipation, or diarrhea    Genitourinary: Denies urinary symptoms    Neurologic: See HPI.     MSK: See HPI. Psychiatric: Denies sleep disturbance, anxiety, depression    Hematologic/Lymphatic/Immunologic: +Easy bruising         Physical Exam:   Blood pressure (!) 157/73, pulse 98, height 5' 6\" (1.676 m), weight 154 lb (69.9 kg). General: well developed and well nourished in no acute distress. Body habitus is thin  HEENT: No rhinorrhea, sneezing, yawning, or lacrimation. No scleral icterus or conjunctival injection. Resp: symmetrical chest expansion, unlabored breathing, respirations unlabored. CV: Heart rate is regular. Peripheral pulses are palpable  Lymph: No visible regional lymphadenopathy. Skin: No rashes or ecchymosis. Normal turgor. Psych: Mood is calm. Affect is normal.   Ext: No edema noted      MSK:   Back/Hip Exam:   Inspection: Pelvis was symmetric. Lumbar lordotic curvature was decreased. There was no scoliosis.  No ecchymoses or erythema. Palpation: Palpatory exam revealed no tenderness along lumbosacral paraspinals, midline spine, SI joint sulcus, greater trochanters and TFL on both side. TTP right glute. There was no paraspinal spasms.  There were no trigger points.    ROM: ROM decreased   Special/provocative testing:   Seating SLR negative.      Neurological Exam:  Strength:                     R          L  Hip Flex                       5          5  Knee Ext                     5          9  Ankle OJGQP                  1          6  GWQ                             5          9  Ankle Plantar               5          6     Sensory:  Intact for light touch in all lower extremity dermatomes.      Reflexes:                     R          L  Patellar                        (1+)      (1+)  Ankle Jerk                   (0)        (0)        Gai: narrow based, externally rotated feet. Forward flexed at hips, slightly unsteady     Imaging:   MRI L spine     Impression:   Mamadou Bejarano is a 80 y.o. male     1. Lumbosacral radiculitis    2. Lumbar spondylosis    3. Chronic right-sided low back pain with right-sided sciatica    4. Radiculopathy, lumbosacral region          Plan:   · MRI was reviewed again. · Will proceed with right L4-5 and L5-S1 TFESI. Procedure risks, benefits and alternatives were discussed. Patient would like to proceed. · Continue HEP      · The patient was educated about the diagnosis, prognosis, indications, risks and benefits of treatment. An opportunity to ask questions was given to the patient and questions were answered.   The patient agreed to proceed with the recommended treatment as described above.      Follow up after injection      Chivo Gan DO, [facility]   Board Certified Physical Medicine and Rehabilitation

## 2021-11-02 DIAGNOSIS — M54.17 LUMBOSACRAL RADICULITIS: ICD-10-CM

## 2021-11-03 ENCOUNTER — OFFICE VISIT (OUTPATIENT)
Dept: PHYSICAL MEDICINE AND REHAB | Age: 86
End: 2021-11-03
Payer: MEDICARE

## 2021-11-03 VITALS
DIASTOLIC BLOOD PRESSURE: 68 MMHG | HEIGHT: 67 IN | WEIGHT: 155 LBS | BODY MASS INDEX: 24.33 KG/M2 | SYSTOLIC BLOOD PRESSURE: 135 MMHG | HEART RATE: 94 BPM

## 2021-11-03 DIAGNOSIS — M54.41 CHRONIC RIGHT-SIDED LOW BACK PAIN WITH RIGHT-SIDED SCIATICA: ICD-10-CM

## 2021-11-03 DIAGNOSIS — M54.17 RADICULOPATHY, LUMBOSACRAL REGION: ICD-10-CM

## 2021-11-03 DIAGNOSIS — M47.816 LUMBAR SPONDYLOSIS: Primary | ICD-10-CM

## 2021-11-03 DIAGNOSIS — M48.061 NEUROFORAMINAL STENOSIS OF LUMBAR SPINE: ICD-10-CM

## 2021-11-03 DIAGNOSIS — G89.29 CHRONIC RIGHT-SIDED LOW BACK PAIN WITH RIGHT-SIDED SCIATICA: ICD-10-CM

## 2021-11-03 PROCEDURE — 99214 OFFICE O/P EST MOD 30 MIN: CPT | Performed by: PHYSICAL MEDICINE & REHABILITATION

## 2021-11-03 PROCEDURE — 1123F ACP DISCUSS/DSCN MKR DOCD: CPT | Performed by: PHYSICAL MEDICINE & REHABILITATION

## 2021-11-03 PROCEDURE — G8420 CALC BMI NORM PARAMETERS: HCPCS | Performed by: PHYSICAL MEDICINE & REHABILITATION

## 2021-11-03 PROCEDURE — G8427 DOCREV CUR MEDS BY ELIG CLIN: HCPCS | Performed by: PHYSICAL MEDICINE & REHABILITATION

## 2021-11-03 PROCEDURE — 1036F TOBACCO NON-USER: CPT | Performed by: PHYSICAL MEDICINE & REHABILITATION

## 2021-11-03 PROCEDURE — G8484 FLU IMMUNIZE NO ADMIN: HCPCS | Performed by: PHYSICAL MEDICINE & REHABILITATION

## 2021-11-03 PROCEDURE — 4040F PNEUMOC VAC/ADMIN/RCVD: CPT | Performed by: PHYSICAL MEDICINE & REHABILITATION

## 2021-11-03 RX ORDER — NETARSUDIL 0.2 MG/ML
1 SOLUTION/ DROPS OPHTHALMIC; TOPICAL DAILY
COMMUNITY
Start: 2021-10-05 | End: 2021-11-03

## 2021-11-03 NOTE — PROGRESS NOTES
Angel Oliva, [address] [facility] Physical Medicine and Rehabilitation  [address] [facility]  Phone: [phone]  Fax: [phone]    PCP: Tati Atkinson MD  Date of visit: 11/3/21    Chief Complaint   Patient presents with    Back Pain     Fllow up MOE     Interval:   Patient presents today for injection follow up. He underwent right L4-5 and L5-S1 with no relief. The pain is rated Pain Score:   8, is described as achy and sometimes burning located in right low back with radiation into right leg. The pain is better with rest.  The pain is worse with standing and walking. There is no associated numbness/tingling. There is weakness in both legs. There is no bowel/bladder changes. He is asking about cymbalta and to see a surgeon. The prior workup has included: MRI L Spine     The prior treatment has included:  PT: yes. Chiropractic: none    Modalities: none   OTC Tylenol: yes with relief   NSAIDS: occasional ibuprofen.      Opioids: none    Membrane stabilizers: none   Muscle relaxers: none   Previous injections: right L4-5 and L5-S1 TFESI  Previous surgery at this site: none    Allergies   Allergen Reactions    Sumycin [Tetracycline Hcl]     Tetracyclines & Related Itching and Rash     sumyacin       Current Outpatient Medications   Medication Sig Dispense Refill    Acetaminophen (TYLENOL GO TABS EXTRA STRENGTH PO) Take by mouth daily      RHOPRESSA 0.02 % SOLN INSTILL 1 DROP IN LEFT EYE EVERY DAY      amLODIPine (NORVASC) 5 MG tablet TAKE ONE TABLET BY MOUTH EVERY DAY      atorvastatin (LIPITOR) 40 MG tablet TAKE ONE-HALF TABLET BY MOUTH AT BEDTIME      LYCOPENE PO Take 20 mg by mouth daily      lisinopril (PRINIVIL;ZESTRIL) 10 MG tablet Take 10 mg by mouth daily      brimonidine (ALPHAGAN) 0.2 % ophthalmic solution 1 drop 3 times daily      Multiple Vitamins-Minerals (PRESERVISION/LUTEIN) CAPS Take 1 capsule by mouth 2 times daily       Cholecalciferol (VITAMIN D) 2000 units CAPS capsule Take 1 capsule by mouth daily       Calcium Citrate-Vitamin D 200-200 MG-UNIT TABS Take 1,200 mg by mouth daily. Pill also has Vit D 800 Units Pt takes two pills a day      mesalamine (PENTASA) 250 MG CR capsule Take 250 mg by mouth Takes 6 tab three time daily      Multiple Vitamin (MULTIVITAMIN PO) Take  by mouth daily.  Bilberry 1000 MG CAPS Take 2,000 mg by mouth daily       Lutein 40 MG CAPS Take 40 mg by mouth daily  (Patient not taking: Reported on 11/3/2021)      Coenzyme Q10 100 MG CAPS Take 200 mg by mouth daily. (Patient not taking: Reported on 11/3/2021)       No current facility-administered medications for this visit. Past Medical History:   Diagnosis Date    Carotid artery occlusion     Chronic pain of left knee     Crohn disease (HCC)     Hypertension     Mitral regurgitation     Osteopenia     Osteoporosis     Tricuspid regurgitation     WPW (Donato-Parkinson-White syndrome)     had ablation to correct       Past Surgical History:   Procedure Laterality Date    ABLATION OF DYSRHYTHMIC FOCUS  2009    wpw    COLECTOMY      x 3  Due to Crohns Disease  Age 52, Age 46 and 5   [facility] INGUINAL HERNIA REPAIR Right     Undecended testicle    PAIN MANAGEMENT PROCEDURE N/A 10/14/2021    RIGHT L 4-5 AND L 5-S1 TRANSFORAMINAL EPIDURAL STEROID INJECTION (92107)(wants last) performed by Charlie Macdonald DO at [address] [place] [address]       Family History   Problem Relation Age of Onset    Heart Disease Mother     Cancer Father         Stomach Cancer       Social History     Tobacco Use    Smoking status: Passive Smoke Exposure - Never Smoker    Smokeless tobacco: Never Used    Tobacco comment: 54 year due to second hand from wife. Vaping Use    Vaping Use: Never used   Substance Use Topics    Alcohol use: No    Drug use: No          Functional Status:    The patient is able to ambulate and perform activities of daily living without the use of an assistive device. Occupation: The patient is currently retired       ROS:    Constitutional: Denies fevers, chills, night sweats, unintentional weight loss     Skin: Denies rash or skin changes     Eyes: Denies vision changes    Ears/Nose/Throat: Denies nasal congestion or sore throat     Respiratory: Denies SOB or cough     Cardiovascular: Denies CP, palpitations, edema      Gastrointestinal: Denies abdominal pain,  N/V, constipation, or diarrhea    Genitourinary: Denies urinary symptoms    Neurologic: See HPI.     MSK: See HPI. Psychiatric: Denies sleep disturbance, anxiety, depression    Hematologic/Lymphatic/Immunologic: +Easy bruising       Physical Exam:   Blood pressure 135/68, pulse 94, height 5' 7\" (1.702 m), weight 155 lb (70.3 kg). General: well developed and well nourished in no acute distress. Body habitus is thin  HEENT: No rhinorrhea, sneezing, yawning, or lacrimation. No scleral icterus or conjunctival injection. Resp: symmetrical chest expansion, unlabored breathing, respirations unlabored. CV: Heart rate is regular. Peripheral pulses are palpable  Lymph: No visible regional lymphadenopathy. Skin: No rashes or ecchymosis. Normal turgor. Psych: Mood is calm. Affect is normal.   Ext: No edema noted     MSK:   Back/Hip Exam:   Inspection: Pelvis was symmetric. Lumbar lordotic curvature was decreased. There was no scoliosis. No ecchymoses or erythema. Palpation: Palpatory exam revealed no tenderness along lumbosacral paraspinals, midline spine, SI joint sulcus, greater trochanters and TFL on both side. TTP right glute. There was no paraspinal spasms.  There were no trigger points.       Neurological Exam:  Strength:                     R          L  Hip Flex                       5          5  Knee Ext                     5          5  Ankle dorsi                  4          4  EHL                             4          4  Ankle Plantar               5 5     Sensory:  Intact for light touch in all lower extremity dermatomes.      Reflexes:                     R          L  Patellar                        (1+)      (1+)  Ankle Jerk                   (0)        (0)        Gai: narrow based, externally rotated feet. Forward flexed at hips, slightly unsteady    Imaging:   MRI L spine    Impression:   Evelina Wing is a 80 y.o. male    1. Lumbar spondylosis    2. Radiculopathy, lumbosacral region    3. Neuroforaminal stenosis of lumbar spine    4. Chronic right-sided low back pain with right-sided sciatica        Plan:   · No relief with epidural. Had temporary increase in pain. Would not recommend repeating it. · Will refer to [address] [place] [address] for surgical opinion at this time. · He wants to try acupuncture for his symptoms. · If not surgical candidate, would consider pain clinic.  The patient was educated about the diagnosis, prognosis, indications, risks and benefits of treatment. An opportunity to ask questions was given to the patient and questions were answered. The patient agreed to proceed with the recommended treatment as described above.    [address] [place] [address] DO Vasyl, FAAPMR   Board Certified Physical Medicine and Rehabilitation

## 2022-06-09 ENCOUNTER — OFFICE VISIT (OUTPATIENT)
Dept: ORTHOPEDIC SURGERY | Age: 87
End: 2022-06-09
Payer: MEDICARE

## 2022-06-09 VITALS — BODY MASS INDEX: 24.33 KG/M2 | WEIGHT: 155 LBS | HEIGHT: 67 IN

## 2022-06-09 DIAGNOSIS — M17.12 PRIMARY OSTEOARTHRITIS OF LEFT KNEE: Primary | ICD-10-CM

## 2022-06-09 PROCEDURE — 20610 DRAIN/INJ JOINT/BURSA W/O US: CPT | Performed by: ORTHOPAEDIC SURGERY

## 2022-06-09 RX ORDER — BUPIVACAINE HYDROCHLORIDE 2.5 MG/ML
3 INJECTION, SOLUTION INFILTRATION; PERINEURAL ONCE
Status: COMPLETED | OUTPATIENT
Start: 2022-06-09 | End: 2022-06-09

## 2022-06-09 RX ORDER — TRIAMCINOLONE ACETONIDE 40 MG/ML
80 INJECTION, SUSPENSION INTRA-ARTICULAR; INTRAMUSCULAR ONCE
Status: COMPLETED | OUTPATIENT
Start: 2022-06-09 | End: 2022-06-09

## 2022-06-09 RX ADMIN — TRIAMCINOLONE ACETONIDE 80 MG: 40 INJECTION, SUSPENSION INTRA-ARTICULAR; INTRAMUSCULAR at 14:18

## 2022-06-09 RX ADMIN — BUPIVACAINE HYDROCHLORIDE 7.5 MG: 2.5 INJECTION, SOLUTION INFILTRATION; PERINEURAL at 14:17

## 2022-06-09 NOTE — PROGRESS NOTES
Chief Complaint:   Chief Complaint   Patient presents with    Knee Pain     Increase left knee pain. Increase in knee pain after he started riding stationary bike 1 month ago. Requesting repeat injection. Last given 7/22/2021 which worked well. Ronnell Blake is now 80years old, presents with recent recurrence and progression of left knee pain, he has had injection in the past about a year ago which helped for a long time. He has no other active joint complaints. Knee pain is interfering with daily activities including standing walking stairs transfers, has not responded to relative rest or oral medication. Patient requests repeat injection left knee today. Allergies; medications; past medical, surgical, family, and social history; and problem list have been reviewed today and updated as indicated in this encounter seen below. Exam: Leg lengths equal hip motion painless right knee benign, left knee with synovitis mild deformity tender to palpation, range of motion limited from the extremes by pain but no laxity with stress. No erythema or effusion. Radiographs: Prior x-rays are reviewed showing advanced DJD left knee. Oswaldo Ramos was seen today for knee pain. Diagnoses and all orders for this visit:    Primary osteoarthritis of left knee  -     NC ARTHROCENTESIS ASPIR&/INJ MAJOR JT/BURSA W/O US    Other orders  -     triamcinolone acetonide (KENALOG-40) injection 80 mg  -     bupivacaine (MARCAINE) 0.25 % injection 7.5 mg       Diagnosis and treatment options were reviewed, patient does not wish to consider surgical intervention at his age, and his request we performed injection of Kenalog and Marcaine into the left knee without difficulty or complication tolerated well. He will pursue low-impact exercise and follow-up here as needed. Return if symptoms worsen or fail to improve.      Current Outpatient Medications   Medication Sig Dispense Refill    Acetaminophen (TYLENOL GO TABS EXTRA STRENGTH PO) Take by mouth daily      RHOPRESSA 0.02 % SOLN INSTILL 1 DROP IN LEFT EYE EVERY DAY      amLODIPine (NORVASC) 5 MG tablet TAKE ONE TABLET BY MOUTH EVERY DAY      atorvastatin (LIPITOR) 40 MG tablet TAKE ONE-HALF TABLET BY MOUTH AT BEDTIME      LYCOPENE PO Take 20 mg by mouth daily      lisinopril (PRINIVIL;ZESTRIL) 10 MG tablet Take 10 mg by mouth daily      brimonidine (ALPHAGAN) 0.2 % ophthalmic solution 1 drop 3 times daily      Multiple Vitamins-Minerals (PRESERVISION/LUTEIN) CAPS Take 1 capsule by mouth 2 times daily       Cholecalciferol (VITAMIN D) 2000 units CAPS capsule Take 1 capsule by mouth daily       Calcium Citrate-Vitamin D 200-200 MG-UNIT TABS Take 1,200 mg by mouth daily. Pill also has Vit D 800 Units Pt takes two pills a day      mesalamine (PENTASA) 250 MG CR capsule Take 250 mg by mouth Takes 6 tab three time daily      Multiple Vitamin (MULTIVITAMIN PO) Take  by mouth daily.  Lutein 40 MG CAPS Take 40 mg by mouth daily  (Patient not taking: Reported on 11/3/2021)      Coenzyme Q10 100 MG CAPS Take 200 mg by mouth daily. (Patient not taking: Reported on 11/3/2021)      Bilberry 1000 MG CAPS Take 2,000 mg by mouth daily  (Patient not taking: Reported on 6/9/2022)       No current facility-administered medications for this visit.        Patient Active Problem List   Diagnosis    WPW (Donato-Parkinson-White syndrome) - s/p ablation    Crohn's disease ([address]    Mitral regurgitation - mild     Tricuspid regurgitation - moderate     Essential hypertension    Chest pain    Atrial tachycardia ([address]    Lumbar radiculopathy       Past Medical History:   Diagnosis Date    Carotid artery occlusion     Chronic pain of left knee     Crohn disease ([address]     Hypertension     Mitral regurgitation     Osteopenia     Osteoporosis     Tricuspid regurgitation     WPW (Donato-Parkinson-White syndrome)     had ablation to correct       Past Surgical History:   Procedure Laterality Date    ABLATION OF DYSRHYTHMIC FOCUS  2009    wpw    COLECTOMY      x 3  Due to Crohns Disease  Age 52, Age 46 and 5   [facility] INGUINAL HERNIA REPAIR Right     Undecended testicle    PAIN MANAGEMENT PROCEDURE N/A 10/14/2021    RIGHT L 4-5 AND L 5-S1 TRANSFORAMINAL EPIDURAL STEROID INJECTION (93139)(wants last) performed by Ruiz Wise DO at [address]   Allergen Reactions    Latanoprost Swelling and Other (See Comments)    Sumycin [Tetracycline Hcl]     Tetracyclines & Related Itching and Rash     sumyacin       Social History     Socioeconomic History    Marital status:      Spouse name: None    Number of children: None    Years of education: None    Highest education level: None   Occupational History    None   Tobacco Use    Smoking status: Passive Smoke Exposure - Never Smoker    Smokeless tobacco: Never Used    Tobacco comment: 55 year due to second hand from wife. Vaping Use    Vaping Use: Never used   Substance and Sexual Activity    Alcohol use: No    Drug use: No    Sexual activity: Not Currently   Other Topics Concern    None   Social History Narrative    None     Social Determinants of Health     Financial Resource Strain:     Difficulty of Paying Living Expenses: Not on file   Food Insecurity:     Worried About Running Out of Food in the Last Year: Not on file    Rosalio of Food in the Last Year: Not on file   Transportation Needs:     Lack of Transportation (Medical): Not on file    Lack of Transportation (Non-Medical):  Not on file   Physical Activity:     Days of Exercise per Week: Not on file    Minutes of Exercise per Session: Not on file   Stress:     Feeling of Stress : Not on file   Social Connections:     Frequency of Communication with Friends and Family: Not on file    Frequency of Social Gatherings with Friends and Family: Not on file    Attends Yarsanism Services: Not on file   [employer] of Clubs or Organizations: Not on file    Attends Club or Organization Meetings: Not on file    Marital Status: Not on file   Intimate Partner Violence:     Fear of Current or Ex-Partner: Not on file    Emotionally Abused: Not on file    Physically Abused: Not on file    Sexually Abused: Not on file   Housing Stability:     Unable to Pay for Housing in the Last Year: Not on file    Number of [place] in the Last Year: Not on file    Unstable Housing in the Last Year: Not on file       Family History   Problem Relation Age of Onset    Heart Disease Mother     Cancer Father         Stomach Cancer         Review of Systems  As follows except as previously noted in HPI:  Constitutional: Negative for chills, diaphoresis, fatigue, fever and unexpected weight change. Respiratory: Negative for cough, shortness of breath and wheezing. Cardiovascular: Negative for chest pain and palpitations. Neurological: Negative for dizziness, syncope, cephalgia. GI / : negative  Musculoskeletal: see HPI       Objective:   Physical Exam   Constitutional: Oriented to person, place, and time. and appears well-developed and well-nourished. :   Head: Normocephalic and atraumatic. Eyes: EOM are normal.   Neck: Neck supple. Cardiovascular: Normal rate and regular rhythm. Pulmonary/Chest: Effort normal. No stridor. No respiratory distress, no wheezes. Abdominal:  No abnormal distension. Neurological: Alert and oriented to person, place, and time. Skin: Skin is warm and dry. Psychiatric: Normal mood and affect.  Behavior is normal. Thought content normal.    6/9/2022  4:56 PM

## 2022-06-28 ENCOUNTER — APPOINTMENT (OUTPATIENT)
Dept: CT IMAGING | Age: 87
End: 2022-06-28
Payer: MEDICARE

## 2022-06-28 ENCOUNTER — HOSPITAL ENCOUNTER (EMERGENCY)
Age: 87
Discharge: HOME | End: 2022-06-28
Attending: EMERGENCY MEDICINE
Payer: MEDICARE

## 2022-06-28 VITALS
DIASTOLIC BLOOD PRESSURE: 78 MMHG | RESPIRATION RATE: 18 BRPM | SYSTOLIC BLOOD PRESSURE: 151 MMHG | BODY MASS INDEX: 23.54 KG/M2 | HEART RATE: 92 BPM | OXYGEN SATURATION: 98 % | TEMPERATURE: 97.9 F | HEIGHT: 67 IN | WEIGHT: 150 LBS
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
## 2022-06-28 DIAGNOSIS — S09.90XA INJURY OF HEAD, INITIAL ENCOUNTER: ICD-10-CM

## 2022-06-28 LAB
ALBUMIN SERPL-MCNC: 4.3 G/DL (ref 3.5–5.2)
ALP BLD-CCNC: 126 U/L (ref 40–129)
ALT SERPL-CCNC: 37 U/L (ref 0–40)
ANION GAP SERPL CALCULATED.3IONS-SCNC: 13 MMOL/L (ref 7–16)
AST SERPL-CCNC: 32 U/L (ref 0–39)
BASOPHILS ABSOLUTE: 0.05 E9/L (ref 0–0.2)
BASOPHILS RELATIVE PERCENT: 0.6 % (ref 0–2)
BILIRUB SERPL-MCNC: 0.4 MG/DL (ref 0–1.2)
BUN BLDV-MCNC: 16 MG/DL (ref 6–23)
CALCIUM SERPL-MCNC: 9.5 MG/DL (ref 8.6–10.2)
CHLORIDE BLD-SCNC: 98 MMOL/L (ref 98–107)
CO2: 25 MMOL/L (ref 22–29)
CREAT SERPL-MCNC: 0.9 MG/DL (ref 0.7–1.2)
EOSINOPHILS ABSOLUTE: 0.14 E9/L (ref 0.05–0.5)
EOSINOPHILS RELATIVE PERCENT: 1.8 % (ref 0–6)
GFR AFRICAN AMERICAN: >60
GFR NON-AFRICAN AMERICAN: >60 ML/MIN/1.73
GLUCOSE BLD-MCNC: 123 MG/DL (ref 74–99)
HCT VFR BLD CALC: 45.3 % (ref 37–54)
HEMOGLOBIN: 15.1 G/DL (ref 12.5–16.5)
IMMATURE GRANULOCYTES #: 0.03 E9/L
IMMATURE GRANULOCYTES %: 0.4 % (ref 0–5)
LYMPHOCYTES ABSOLUTE: 1.61 E9/L (ref 1.5–4)
LYMPHOCYTES RELATIVE PERCENT: 20.6 % (ref 20–42)
MCH RBC QN AUTO: 33.3 PG (ref 26–35)
MCHC RBC AUTO-ENTMCNC: 33.3 % (ref 32–34.5)
MCV RBC AUTO: 99.8 FL (ref 80–99.9)
MONOCYTES ABSOLUTE: 1.28 E9/L (ref 0.1–0.95)
MONOCYTES RELATIVE PERCENT: 16.4 % (ref 2–12)
NEUTROPHILS ABSOLUTE: 4.69 E9/L (ref 1.8–7.3)
NEUTROPHILS RELATIVE PERCENT: 60.2 % (ref 43–80)
PDW BLD-RTO: 13 FL (ref 11.5–15)
PLATELET # BLD: 174 E9/L (ref 130–450)
PMV BLD AUTO: 9.3 FL (ref 7–12)
POTASSIUM REFLEX MAGNESIUM: 4.4 MMOL/L (ref 3.5–5)
RBC # BLD: 4.54 E12/L (ref 3.8–5.8)
SODIUM BLD-SCNC: 136 MMOL/L (ref 132–146)
TOTAL PROTEIN: 7.2 G/DL (ref 6.4–8.3)
WBC # BLD: 7.8 E9/L (ref 4.5–11.5)

## 2022-06-28 PROCEDURE — 85025 COMPLETE CBC W/AUTO DIFF WBC: CPT

## 2022-06-28 PROCEDURE — 80053 COMPREHEN METABOLIC PANEL: CPT

## 2022-06-28 PROCEDURE — 70450 CT HEAD/BRAIN W/O DYE: CPT

## 2022-06-28 PROCEDURE — 72125 CT NECK SPINE W/O DYE: CPT

## 2022-06-28 PROCEDURE — 99284 EMERGENCY DEPT VISIT MOD MDM: CPT

## 2022-06-28 ASSESSMENT — PAIN - FUNCTIONAL ASSESSMENT
PAIN_FUNCTIONAL_ASSESSMENT: NONE - DENIES PAIN

## 2022-06-28 ASSESSMENT — ENCOUNTER SYMPTOMS
CHEST TIGHTNESS: 0
SHORTNESS OF BREATH: 0
COLOR CHANGE: 0
DIARRHEA: 0
NAUSEA: 0
BLOOD IN STOOL: 0
COUGH: 0
CONSTIPATION: 0
ABDOMINAL PAIN: 0
SORE THROAT: 0
VOMITING: 0
CHOKING: 0

## 2022-06-28 NOTE — ED NOTES
Department of Emergency Medicine  FIRST PROVIDER TRIAGE NOTE             Independent MLP           6/28/22  1:38 AM EDT    Date of Encounter: 6/28/22   MRN: [id number]      HPI: Mike Dunaway is a 80 y.o. male who presents to the ED for Fall [place] in yard hit posterior head 1200 yesterday; no having visual changes-THINNERS -LOC; denies head/ neck/ chest pain sob n/v/d)   Patient reports to the ED after a fall out of a stool around noon. Patient reports he slid down out of the stool onto his bottom. He did hit the back of his head but did not lose consciousness. No on blood thinners. No other injuries sustained from the fall. ROS: Negative for cp or sob. PE: Gen Appearance/Constitutional: alert  Musculoskeletal: moves all extremities x 4     Initial Plan of Care: All treatment areas with department are currently occupied. Plan to order/Initiate the following while awaiting opening in ED: labs and imaging studies.   Initiate Treatment-Testing, Proceed toTreatment Area When Bed Available for ED Attending/MLP to Continue Care    Electronically signed by ENZO Choudhury   DD: 6/28/22         Jake Choudhury  06/28/22 5582

## 2022-06-28 NOTE — ED PROVIDER NOTES
ATTENDING PROVIDER ATTESTATION:     Yamilet Terrazas presented to the emergency department for evaluation of Fall [place] in yard hit posterior head 1200 yesterday; no having visual changes-THINNERS -LOC; denies head/ neck/ chest pain sob n/v/d)   and was initially evaluated by the Medical Resident. See Original ED Note for H&P and ED course above. I have reviewed and discussed the case, including pertinent history (medical, surgical, family and social) and exam findings with the Medical Resident assigned to Yamilet Terrazas. I have personally performed and/or participated in the history, exam, medical decision making, and procedures and agree with all pertinent clinical information and any additional changes or corrections are noted below in my assessment and plan. I have discussed this patient in detail with the resident, and provided the instruction and education,       I have reviewed my findings and recommendations with the assigned Medical Resident, Yamilet Terrazas and members of family present at the time of disposition. I have performed a history and physical examination of this patient and directly supervised the resident caring for this patient      History of Present Illness:    Presents to the ED for fall with head injury, beginning prior to arrival.  The complaint has been persistent, mild in severity, and worsened by nothing. Non syncopal fall, hit posterior head yesterday around noon. No loc. Mild occipital head pain. Denies other injuries. No neck or back pain. Not on blood thinners. Denies other complaints. This was not syncope. He denies any other injuries or other complaints.      Review of Systems:   A complete review of systems was performed and pertinent positives and negatives are stated within HPI, all other systems reviewed and are negative.    --------------------------------------------- PAST HISTORY ---------------------------------------------  Past Medical History:  has a past medical history of Carotid artery occlusion, Chronic pain of left knee, Crohn disease ([address] Hypertension, Mitral regurgitation, Osteopenia, Osteoporosis, Tricuspid regurgitation, and WPW (Donato-Parkinson-White syndrome). Past Surgical History:  has a past surgical history that includes ablation of dysrhythmic focus (2009); Inguinal hernia repair (Right); colectomy; and Pain management procedure (N/A, 10/14/2021). Social History:  reports that he is a non-smoker but has been exposed to tobacco smoke. He has never used smokeless tobacco. He reports that he does not drink alcohol and does not use drugs. Family History: family history includes Cancer in his father; Heart Disease in his mother. Unless otherwise noted, family history is non contributory    The patients home medications have been reviewed. Allergies: Latanoprost, Sumycin [tetracycline hcl], and Tetracyclines & related    Physical Exam:  Constitutional/General: Alert and oriented x3  Head: Normocephalic and atraumatic  Eyes: PERRL, EOMI, sclera non icteric  ENT: Oropharynx clear, handling secretions  Neck: Supple, full ROM, no stridor, no meningeal signs, no midline spinal tenderness. Respiratory: Lungs clear to auscultation bilaterally. Not in respiratory distress  Cardiovascular:  Regular rate. 2+ distal pulses. Equal extremity pulses. Musculoskeletal: Moves all extremities x 4. Warm and well perfused  Integument: skin warm and dry. No rashes. Neurologic: GCS 15, no focal deficits, normal hand grasp bilaterally, normal flexion and extension or the arms and legs without motor weakness.    Psychiatric: Normal Affect      I directly supervised any procedures performed by the resident and was present for the procedure including all critical portions of the procedure      I, Dr. Ghanshyam Kelly, am the primary provider of record    My Medical Decision Making:         Fall  Imaging reassuring  GCS 15  Not in distress  Normal neuro exam  Appropriate for dc home        1. Fall, initial encounter    2.  Injury of head, initial encounter           Dillon Alvarez MD  06/28/22 1220

## 2022-06-28 NOTE — ED PROVIDER NOTES
[address] [facility] ENCOUNTER      Pt Name: Heladio Chappell  MRN: [id number]  [place] 1/31/1932  Date of evaluation: 6/28/2022      CHIEF COMPLAINT       Chief Complaint   Patient presents with   [employer] in yard hit posterior head 1200 yesterday; no having visual changes-THINNERS -LOC; denies head/ neck/ chest pain sob n/v/d        HPI  Heladio Chappell is a 80 y.o. male  presents with a chemical fall she states that he was sitting on a stepstool about 1 inch off the floor yesterday at 1 PM when he was working in the garden. When he reached backwards for some weeds, he fell backwards hit his back and the back of his head, he denies any loss of consciousness, denies any blood thinners. He ambulated immediately after his fall, with no issues. Denies any symptoms at this time including nausea, vomiting, vision changes, headache, dizziness, fever, chills, neck tenderness or stiffness, weakness, cp, palpitations, leg swelling/tenderness, sob, cough, abd pain, dysuria, hematuria, diarrhea, constipation. Describes symptoms mild in severity with no alleviating or exacerbating factors. States he decided to come into the ED as he was worried since he had a head trauma. Except as noted above the remainder of the review of systems was reviewed and negative. Review of Systems   Constitutional: Negative for appetite change, chills, fatigue and fever. HENT: Negative for congestion and sore throat. Eyes: Negative for visual disturbance. Respiratory: Negative for cough, choking, chest tightness and shortness of breath. Cardiovascular: Negative for chest pain, palpitations and leg swelling. Gastrointestinal: Negative for abdominal pain, blood in stool, constipation, diarrhea, nausea and vomiting. Endocrine: Negative for polyphagia. Genitourinary: Negative for decreased urine volume, difficulty urinating, flank pain and hematuria. takes less than 2 seconds. Neurological:      General: No focal deficit present. Mental Status: He is alert and oriented to person, place, and time. Mental status is at baseline. Cranial Nerves: No cranial nerve deficit. Sensory: No sensory deficit. Motor: No weakness. Coordination: Coordination normal.      Gait: Gait normal.      Deep Tendon Reflexes: Reflexes normal.   Psychiatric:         Mood and Affect: Mood normal.         Behavior: Behavior normal.         Thought Content: Thought content normal.         Judgment: Judgment normal.          Procedures     [facility]        [address] [place] G y.o. male  presents with a chemical fall she states that he was sitting on a stepstool about 1 inch off the floor yesterday at 1 PM when he was working in the garden. While in the ED patient was hemodynamically stable, afebrile, nontoxic-appearing, in no respiratory distress. Physical exam unremarkable. Neurovascularly intact no signs of trauma. Labs unremarkable. CT head and neck negative for any acute intracranial abnormalities. he has spinal canal stenosis at C5-6 that are chronic and he was aware of these findings. Patient feels comfortable going home and will follow up outpatient with PCP. Patient understands to return to the ED in case of worsening symptoms. ED Course as of 06/28/22 0655   Tue Jun 28, 2022   0627    IMPRESSION:  No acute intracranial abnormality. No acute abnormality of the cervical spine.     Multilevel degenerative change with multilevel foraminal stenosis.     Spinal canal stenosis at C5-C6.  [TC]      ED Course User Index  [TC] Darius Dhillon MD       --------------------------------------------- PAST HISTORY ---------------------------------------------  Past Medical History:  has a past medical history of Carotid artery occlusion, Chronic pain of left knee, Crohn disease ([address] Hypertension, Mitral regurgitation, Osteopenia, Osteoporosis, Tricuspid regurgitation, and WPW (Donato-Parkinson-White syndrome). Past Surgical History:  has a past surgical history that includes ablation of dysrhythmic focus (2009); Inguinal hernia repair (Right); colectomy; and Pain management procedure (N/A, 10/14/2021). Social History:  reports that he is a non-smoker but has been exposed to tobacco smoke. He has never used smokeless tobacco. He reports that he does not drink alcohol and does not use drugs. Family History: family history includes Cancer in his father; Heart Disease in his mother. The patients home medications have been reviewed.     Allergies: Latanoprost, Sumycin [tetracycline hcl], and Tetracyclines & related    -------------------------------------------------- RESULTS -------------------------------------------------  Labs:  Results for orders placed or performed during the hospital encounter of 06/28/22   CBC with Auto Differential   Result Value Ref Range    WBC 7.8 4.5 - 11.5 E9/L    RBC 4.54 3.80 - 5.80 E12/L    Hemoglobin 15.1 12.5 - 16.5 g/dL    Hematocrit 45.3 37.0 - 54.0 %    MCV 99.8 80.0 - 99.9 fL    MCH 33.3 26.0 - 35.0 pg    MCHC 33.3 32.0 - 34.5 %    RDW 13.0 11.5 - 15.0 fL    Platelets 192 665 - 507 E9/L    MPV 9.3 7.0 - 12.0 fL    Neutrophils % 60.2 43.0 - 80.0 %    Immature Granulocytes % 0.4 0.0 - 5.0 %    Lymphocytes % 20.6 20.0 - 42.0 %    Monocytes % 16.4 (H) 2.0 - 12.0 %    Eosinophils % 1.8 0.0 - 6.0 %    Basophils % 0.6 0.0 - 2.0 %    Neutrophils Absolute 4.69 1.80 - 7.30 E9/L    Immature Granulocytes # 0.03 E9/L    Lymphocytes Absolute 1.61 1.50 - 4.00 E9/L    Monocytes Absolute 1.28 (H) 0.10 - 0.95 E9/L    Eosinophils Absolute 0.14 0.05 - 0.50 E9/L    Basophils Absolute 0.05 0.00 - 0.20 E9/L   Comprehensive Metabolic Panel w/ Reflex to MG   Result Value Ref Range    Sodium 136 132 - 146 mmol/L    Potassium reflex Magnesium 4.4 3.5 - 5.0 mmol/L    Chloride 98 98 - 107 mmol/L    CO2 25 22 - 29 mmol/L    Anion Gap 13 7 - 16 mmol/L    Glucose 123 (H) 74 - 99 mg/dL    BUN 16 6 - 23 mg/dL    CREATININE 0.9 0.7 - 1.2 mg/dL    GFR Non-African American >60 >=60 mL/min/1.73    GFR African American >60     Calcium 9.5 8.6 - 10.2 mg/dL    Total Protein 7.2 6.4 - 8.3 g/dL    Albumin 4.3 3.5 - 5.2 g/dL    Total Bilirubin 0.4 0.0 - 1.2 mg/dL    Alkaline Phosphatase 126 40 - 129 U/L    ALT 37 0 - 40 U/L    AST 32 0 - 39 U/L       Radiology:  CT Head WO Contrast   Final Result   No acute intracranial abnormality. CT Cervical Spine WO Contrast   Final Result   No acute abnormality of the cervical spine. Multilevel degenerative change with multilevel foraminal stenosis. Spinal canal stenosis at C5-C6.             ------------------------- NURSING NOTES AND VITALS REVIEWED ---------------------------  Date / Time Roomed:  6/28/2022  6:15 AM  ED Bed Assignment:  BALJIT/BALJIT    The nursing notes within the ED encounter and vital signs as below have been reviewed. BP (!) 151/78   Pulse 92   Temp 97.9 °F (36.6 °C) (Oral)   Resp 18   Ht 5' 7\" (1.702 m)   Wt 150 lb (68 kg)   SpO2 98%   BMI 23.49 kg/m²   Oxygen Saturation Interpretation: Normal      ------------------------------------------ PROGRESS NOTES ------------------------------------------  6:55 AM EDT  I have spoken with the patient and discussed todays results, in addition to providing specific details for the plan of care and counseling regarding the diagnosis and prognosis. Their questions are answered at this time and they are agreeable with the plan. I discussed at length with them reasons for immediate return here for re evaluation. They will followup with their primary care physician by calling their office tomorrow. --------------------------------- ADDITIONAL PROVIDER NOTES ---------------------------------  At this time the patient is without objective evidence of an acute process requiring hospitalization or inpatient management.   They have remained hemodynamically stable throughout their entire ED visit and are stable for discharge with outpatient follow-up. The plan has been discussed in detail and they are aware of the specific conditions for emergent return, as well as the importance of follow-up. Discharge Medication List as of 6/28/2022  6:33 AM          Diagnosis:  1. Fall, initial encounter    2. Injury of head, initial encounter        Disposition:  Patient's disposition: Discharge to home  Patient's condition is stable.        Batool Alarcon MD  Resident  06/28/22 4039

## 2022-09-22 ENCOUNTER — OFFICE VISIT (OUTPATIENT)
Dept: CARDIOLOGY CLINIC | Age: 87
End: 2022-09-22
Payer: MEDICARE

## 2022-09-22 VITALS
WEIGHT: 153.2 LBS | RESPIRATION RATE: 18 BRPM | HEIGHT: 68 IN | DIASTOLIC BLOOD PRESSURE: 60 MMHG | SYSTOLIC BLOOD PRESSURE: 128 MMHG | HEART RATE: 72 BPM | BODY MASS INDEX: 23.22 KG/M2

## 2022-09-22 DIAGNOSIS — I34.0 NONRHEUMATIC MITRAL VALVE REGURGITATION: ICD-10-CM

## 2022-09-22 DIAGNOSIS — I45.6 WPW (WOLFF-PARKINSON-WHITE SYNDROME): ICD-10-CM

## 2022-09-22 DIAGNOSIS — I36.1 NONRHEUMATIC TRICUSPID VALVE REGURGITATION: ICD-10-CM

## 2022-09-22 DIAGNOSIS — I10 ESSENTIAL HYPERTENSION: Primary | ICD-10-CM

## 2022-09-22 PROCEDURE — 1123F ACP DISCUSS/DSCN MKR DOCD: CPT | Performed by: INTERNAL MEDICINE

## 2022-09-22 PROCEDURE — G8420 CALC BMI NORM PARAMETERS: HCPCS | Performed by: INTERNAL MEDICINE

## 2022-09-22 PROCEDURE — G8427 DOCREV CUR MEDS BY ELIG CLIN: HCPCS | Performed by: INTERNAL MEDICINE

## 2022-09-22 PROCEDURE — 99213 OFFICE O/P EST LOW 20 MIN: CPT | Performed by: INTERNAL MEDICINE

## 2022-09-22 PROCEDURE — 1036F TOBACCO NON-USER: CPT | Performed by: INTERNAL MEDICINE

## 2022-09-22 PROCEDURE — 93000 ELECTROCARDIOGRAM COMPLETE: CPT | Performed by: INTERNAL MEDICINE

## 2022-09-22 NOTE — PROGRESS NOTES
Patient Active Problem List   Diagnosis    WPW (Donato-Parkinson-White syndrome) - s/p ablation    Crohn's disease (HCC)    Mitral regurgitation - mild     Tricuspid regurgitation - moderate     Essential hypertension    Chest pain    Atrial tachycardia (HCC)    Lumbar radiculopathy       Current Outpatient Medications   Medication Sig Dispense Refill    Acetaminophen (TYLENOL GO TABS EXTRA STRENGTH PO) Take by mouth daily      RHOPRESSA 0.02 % SOLN INSTILL 1 DROP IN LEFT EYE EVERY DAY      amLODIPine (NORVASC) 5 MG tablet TAKE ONE TABLET BY MOUTH EVERY DAY      atorvastatin (LIPITOR) 40 MG tablet TAKE ONE-HALF TABLET BY MOUTH AT BEDTIME      LYCOPENE PO Take 20 mg by mouth daily      lisinopril (PRINIVIL;ZESTRIL) 10 MG tablet Take 10 mg by mouth daily      brimonidine (ALPHAGAN) 0.2 % ophthalmic solution 1 drop 3 times daily      Multiple Vitamins-Minerals (PRESERVISION/LUTEIN) CAPS Take 1 capsule by mouth 2 times daily       Cholecalciferol (VITAMIN D) 2000 units CAPS capsule Take 1 capsule by mouth daily       Calcium Citrate-Vitamin D 200-200 MG-UNIT TABS Take 1,200 mg by mouth daily. Pill also has Vit D 800 Units Pt takes two pills a day      Lutein 40 MG CAPS Take 40 mg by mouth daily      mesalamine (PENTASA) 250 MG CR capsule Take 250 mg by mouth Takes 6 tab three time daily      Multiple Vitamin (MULTIVITAMIN PO) Take  by mouth daily. No current facility-administered medications for this visit. CC:    Patient is seen in follow up for:  1. Essential hypertension    2. WPW (Donato-Parkinson-White syndrome) - s/p ablation    3. Nonrheumatic mitral valve regurgitation - mild    4. Nonrheumatic tricuspid valve regurgitation - moderate        HPI:  Patient is doing well without any specific cardiac problems. Patient denies any shortness of breath, chest pain, lightheadedness or dizziness. Patient is tolerating medications well without side effects. Considering spinal stenosis surgery.       ROS: General: No unusual weight gain, no change in exercise tolerance  Skin: No rash or itching  EENT: No vision changes or nosebleeds  Cardiovascular: No orthopnea or paroxysmal nocturnal dyspnea  Respiratory: No cough or hemoptysis  Gastrointestinal: No hematemesis or recent changes in bowel habits  Genitourinary: No hematuria, urgency or frequency  Musculoskeletal: No muscular weakness or joint swelling   Neurologic / Psychiatric: No incoordination or convulsions  Allergic / Immunologic/ Lymphatic / Endocrine: No anemia or bleeding tendency        Social History     Socioeconomic History    Marital status:      Spouse name: Not on file    Number of children: Not on file    Years of education: Not on file    Highest education level: Not on file   Occupational History    Not on file   Tobacco Use    Smoking status: Never     Passive exposure: Yes    Smokeless tobacco: Never    Tobacco comments:     55 year due to second hand from wife.    Vaping Use    Vaping Use: Never used   Substance and Sexual Activity    Alcohol use: No    Drug use: No    Sexual activity: Not Currently   Other Topics Concern    Not on file   Social History Narrative    Not on file     Social Determinants of Health     Financial Resource Strain: Not on file   Food Insecurity: Not on file   Transportation Needs: Not on file   Physical Activity: Not on file   Stress: Not on file   Social Connections: Not on file   Intimate Partner Violence: Not on file   Housing Stability: Not on file       Past Medical History:   Diagnosis Date    Carotid artery occlusion     Chronic pain of left knee     Crohn disease ([facility] [address]     Hypertension     Mitral regurgitation     Osteopenia     Osteoporosis     Tricuspid regurgitation     WPW (Donato-Parkinson-White syndrome)     had ablation to correct       PHYSICAL EXAM:  CONSTITUTIONAL:  Well developed, well nourished    Vitals:    09/22/22 1233   BP: 128/60   Pulse: 72   Resp: 18   Weight: 153 lb 3.2 oz (69.5 kg) Height: 5' 8\" (1.727 m)     HEAD & FACE: Normocephalic. Symmetric. EYES: No xanthelasma. Conjunctivae not injected. EARS, NOSE, MOUTH & THROAT: Good dentition. No oral pallor or cyanosis. NECK: No JVD at 30 degrees. No thyromegaly. RESPIRATORY: Clear to auscultation and percussion in all fields. No use of accessory muscle or intercostal retractions. CARDIOVASCULAR: Regular rate and rhythm. No lifts or thrills on palpitation. Auscultation with normal S1-S2 in intensity and splitting. No carotid bruits. Abdominal aorta not enlarged. Femoral arteries without bruits. Pedal pulses 2+. No edema. ABDOMEN: Soft without hepatic or splenic enlargement. No tenderness. MUSCULOSKELETAL: No kyphosis or scoliosis of the back. Good muscle strength and tone. No muscle atrophy. Normal gait and ability to undergo exercise stress testing. EXTREMITIES: No clubbing or cyanosis. SKIN: No Xanthomas or ulcerations. NEUROLOGIC: Oriented to time, place and person. Normal mood and affect. LYMPHATIC:  No palpable neck or supraclavicular nodes. No splenomegaly. EKG: Normal sinus rhythm. Short [place] No change compared to prior tracing.  ms. ASSESSMENT:                                                     ORDERS:       Diagnosis Orders   1. Essential hypertension  EKG 12 lead      2. WPW (Donato-Parkinson-White syndrome) - s/p ablation        3. Nonrheumatic mitral valve regurgitation - mild        4. Nonrheumatic tricuspid valve regurgitation - moderate          Above assessment cardiac issues stable. PLAN:   See above orders. Reviewed prior records and testing: LDL 49 on 4/9/2019  Assessment of medication compliance. Relates recent carotid ultrasound with only mild plaque by [facility]. Discussed issues that would prompt earlier evaluation. Follow-up office visit in 1 year.

## 2022-10-13 ENCOUNTER — TELEPHONE (OUTPATIENT)
Dept: CARDIOLOGY CLINIC | Age: 87
End: 2022-10-13

## 2022-10-13 NOTE — TELEPHONE ENCOUNTER
Patient called and stated that he has been started on Gabapentin 100 mg bid and he wants to make sure this is ok with you

## 2022-10-31 ENCOUNTER — TELEPHONE (OUTPATIENT)
Dept: ADMINISTRATIVE | Age: 87
End: 2022-10-31

## 2022-10-31 DIAGNOSIS — R06.02 SOB (SHORTNESS OF BREATH): Primary | ICD-10-CM

## 2022-10-31 NOTE — TELEPHONE ENCOUNTER
Pt called to schedule an appt with Dr. Jem Dawkins. Pt states he is retaining fluid. Pt was just seen by Dr. Channing Stack 9/22/22. [facility]   Please call pt and advise

## 2022-11-01 ENCOUNTER — HOSPITAL ENCOUNTER (OUTPATIENT)
Age: 87
Discharge: HOME | End: 2022-11-01
Payer: MEDICARE

## 2022-11-01 DIAGNOSIS — R06.02 SOB (SHORTNESS OF BREATH): ICD-10-CM

## 2022-11-01 LAB — PRO-BNP: 217 PG/ML (ref 0–450)

## 2022-11-01 PROCEDURE — 36415 COLL VENOUS BLD VENIPUNCTURE: CPT

## 2022-11-01 PROCEDURE — 83880 ASSAY OF NATRIURETIC PEPTIDE: CPT

## 2022-11-02 ENCOUNTER — TELEPHONE (OUTPATIENT)
Dept: CARDIOLOGY CLINIC | Age: 87
End: 2022-11-02

## 2022-11-02 NOTE — TELEPHONE ENCOUNTER
----- Message from Umm Turner MD sent at 11/1/2022  4:49 PM EDT -----  Please let patient know the blood test for congestive heart failure was negative.

## 2022-11-03 ENCOUNTER — OFFICE VISIT (OUTPATIENT)
Dept: CARDIOLOGY CLINIC | Age: 87
End: 2022-11-03
Payer: MEDICARE

## 2022-11-03 VITALS
HEIGHT: 67 IN | HEART RATE: 86 BPM | BODY MASS INDEX: 24.27 KG/M2 | WEIGHT: 154.6 LBS | SYSTOLIC BLOOD PRESSURE: 126 MMHG | DIASTOLIC BLOOD PRESSURE: 62 MMHG

## 2022-11-03 DIAGNOSIS — R68.89 OTHER GENERAL SYMPTOMS AND SIGNS: ICD-10-CM

## 2022-11-03 DIAGNOSIS — I34.0 NONRHEUMATIC MITRAL VALVE REGURGITATION: ICD-10-CM

## 2022-11-03 DIAGNOSIS — R60.9 DEPENDENT EDEMA: ICD-10-CM

## 2022-11-03 DIAGNOSIS — I10 ESSENTIAL HYPERTENSION: Primary | ICD-10-CM

## 2022-11-03 DIAGNOSIS — I36.1 NONRHEUMATIC TRICUSPID VALVE REGURGITATION: ICD-10-CM

## 2022-11-03 DIAGNOSIS — I45.6 WPW (WOLFF-PARKINSON-WHITE SYNDROME): ICD-10-CM

## 2022-11-03 PROCEDURE — G8420 CALC BMI NORM PARAMETERS: HCPCS | Performed by: INTERNAL MEDICINE

## 2022-11-03 PROCEDURE — 93000 ELECTROCARDIOGRAM COMPLETE: CPT | Performed by: INTERNAL MEDICINE

## 2022-11-03 PROCEDURE — G8484 FLU IMMUNIZE NO ADMIN: HCPCS | Performed by: INTERNAL MEDICINE

## 2022-11-03 PROCEDURE — 1123F ACP DISCUSS/DSCN MKR DOCD: CPT | Performed by: INTERNAL MEDICINE

## 2022-11-03 PROCEDURE — G8427 DOCREV CUR MEDS BY ELIG CLIN: HCPCS | Performed by: INTERNAL MEDICINE

## 2022-11-03 PROCEDURE — 99214 OFFICE O/P EST MOD 30 MIN: CPT | Performed by: INTERNAL MEDICINE

## 2022-11-03 PROCEDURE — 1036F TOBACCO NON-USER: CPT | Performed by: INTERNAL MEDICINE

## 2022-11-03 NOTE — PROGRESS NOTES
Patient Active Problem List   Diagnosis    WPW (Donato-Parkinson-White syndrome) - s/p ablation    Crohn's disease (HCC)    Mitral regurgitation - mild     Tricuspid regurgitation - moderate     Essential hypertension    Chest pain    Atrial tachycardia (HCC)    Lumbar radiculopathy       Current Outpatient Medications   Medication Sig Dispense Refill    Acetaminophen (TYLENOL GO TABS EXTRA STRENGTH PO) Take by mouth daily      RHOPRESSA 0.02 % SOLN INSTILL 1 DROP IN LEFT EYE EVERY DAY      amLODIPine (NORVASC) 5 MG tablet TAKE ONE TABLET BY MOUTH EVERY DAY      atorvastatin (LIPITOR) 40 MG tablet TAKE ONE-HALF TABLET BY MOUTH AT BEDTIME      LYCOPENE PO Take 20 mg by mouth daily      lisinopril (PRINIVIL;ZESTRIL) 10 MG tablet Take 10 mg by mouth daily      brimonidine (ALPHAGAN) 0.2 % ophthalmic solution 1 drop 3 times daily      Multiple Vitamins-Minerals (PRESERVISION/LUTEIN) CAPS Take 1 capsule by mouth 2 times daily       Cholecalciferol (VITAMIN D) 2000 units CAPS capsule Take 1 capsule by mouth daily       Calcium Citrate-Vitamin D 200-200 MG-UNIT TABS Take 1,200 mg by mouth daily. Pill also has Vit D 800 Units Pt takes two pills a day      Lutein 40 MG CAPS Take 40 mg by mouth daily      mesalamine (PENTASA) 250 MG CR capsule Take 250 mg by mouth Takes 6 tab three time daily      Multiple Vitamin (MULTIVITAMIN PO) Take  by mouth daily. No current facility-administered medications for this visit. CC:    Patient is seen for new problem of leg edema. In follow up for:  1. Essential hypertension    2. WPW (Donato-Parkinson-White syndrome) - s/p ablation    3. Nonrheumatic mitral valve regurgitation - mild    4. Nonrheumatic tricuspid valve regurgitation - moderate        HPI:  Seen for new problem of bilateral leg edema. Patient denies any shortness of breath, chest pain, lightheadedness or dizziness. Patient is tolerating medications well without side effects.           ROS:   General: No unusual weight gain, no change in exercise tolerance  Skin: No rash or itching  EENT: No vision changes or nosebleeds  Cardiovascular: No orthopnea or paroxysmal nocturnal dyspnea  Respiratory: No cough or hemoptysis  Gastrointestinal: No hematemesis or recent changes in bowel habits  Genitourinary: No hematuria, urgency or frequency  Musculoskeletal: No muscular weakness or joint swelling   Neurologic / Psychiatric: No incoordination or convulsions  Allergic / Immunologic/ Lymphatic / Endocrine: No anemia or bleeding tendency        Social History     Socioeconomic History    Marital status:      Spouse name: Not on file    Number of children: Not on file    Years of education: Not on file    Highest education level: Not on file   Occupational History    Not on file   Tobacco Use    Smoking status: Never     Passive exposure: Yes    Smokeless tobacco: Never    Tobacco comments:     55 year due to second hand from wife.    Vaping Use    Vaping Use: Never used   Substance and Sexual Activity    Alcohol use: No    Drug use: No    Sexual activity: Not Currently   Other Topics Concern    Not on file   Social History Narrative    Not on file     Social Determinants of Health     Financial Resource Strain: Not on file   Food Insecurity: Not on file   Transportation Needs: Not on file   Physical Activity: Not on file   Stress: Not on file   Social Connections: Not on file   Intimate Partner Violence: Not on file   Housing Stability: Not on file       Past Medical History:   Diagnosis Date    Carotid artery occlusion     Chronic pain of left knee     Crohn disease ([facility] [address]     Hypertension     Mitral regurgitation     Osteopenia     Osteoporosis     Tricuspid regurgitation     WPW (Donato-Parkinson-White syndrome)     had ablation to correct       PHYSICAL EXAM:  CONSTITUTIONAL:  Well developed, well nourished    Vitals:    11/03/22 1447   BP: 126/62   Pulse: 86   Weight: 154 lb 9.6 oz (70.1 kg)   Height: 5' 7\" (1.702 m)     HEAD & FACE: Normocephalic. Symmetric. EYES: No xanthelasma. Conjunctivae not injected. EARS, NOSE, MOUTH & THROAT: Good dentition. No oral pallor or cyanosis. NECK: No JVD at 30 degrees. No thyromegaly. RESPIRATORY: Clear to auscultation and percussion in all fields. No use of accessory muscle or intercostal retractions. CARDIOVASCULAR: Regular rate and rhythm. No lifts or thrills on palpitation. Auscultation with normal S1-S2 in intensity and splitting. No carotid bruits. Abdominal aorta not enlarged. Femoral arteries without bruits. Pedal pulses 2+. 1+ bilateral ankle edema. ABDOMEN: Soft without hepatic or splenic enlargement. No tenderness. MUSCULOSKELETAL: No kyphosis or scoliosis of the back. Good muscle strength and tone. No muscle atrophy. Normal gait and ability to undergo exercise stress testing. EXTREMITIES: No clubbing or cyanosis. SKIN: No Xanthomas or ulcerations. NEUROLOGIC: Oriented to time, place and person. Normal mood and affect. LYMPHATIC:  No palpable neck or supraclavicular nodes. No splenomegaly. EKG: Normal sinus rhythm. Short [place] No change compared to prior tracing.  ms. ASSESSMENT:                                                     ORDERS:       Diagnosis Orders   1. Essential hypertension  EKG 12 Lead      2. WPW (Donato-Parkinson-White syndrome) - s/p ablation        3. Nonrheumatic mitral valve regurgitation - mild        4. Nonrheumatic tricuspid valve regurgitation - moderate        5. Dependent edema  ECHO COMPLETE      6. Other general symptoms and signs   ECHO COMPLETE        Above assessment cardiac issues stable/probable side effect to amlodipine. Citlali Balling PLAN:   See above orders. Reviewed prior records and testing: LDL 49 on 4/9/2019  Assessment of medication compliance. Relates recent carotid ultrasound with only mild plaque by [facility]. Discussed issues that would prompt earlier evaluation.    Follow-up office visit in 1 year>>> pending above evaluation. Tacho Dickey

## 2022-11-09 ENCOUNTER — APPOINTMENT (OUTPATIENT)
Dept: CT IMAGING | Age: 87
End: 2022-11-09
Payer: MEDICARE

## 2022-11-09 ENCOUNTER — APPOINTMENT (OUTPATIENT)
Dept: GENERAL RADIOLOGY | Age: 87
End: 2022-11-09
Payer: MEDICARE

## 2022-11-09 ENCOUNTER — HOSPITAL ENCOUNTER (OUTPATIENT)
Age: 87
Setting detail: OBSERVATION
Discharge: HOME | End: 2022-11-09
Attending: STUDENT IN AN ORGANIZED HEALTH CARE EDUCATION/TRAINING PROGRAM | Admitting: INTERNAL MEDICINE
Payer: MEDICARE

## 2022-11-09 VITALS
OXYGEN SATURATION: 95 % | RESPIRATION RATE: 18 BRPM | BODY MASS INDEX: 24.33 KG/M2 | TEMPERATURE: 98 F | WEIGHT: 155 LBS | DIASTOLIC BLOOD PRESSURE: 68 MMHG | SYSTOLIC BLOOD PRESSURE: 108 MMHG | HEIGHT: 67 IN | HEART RATE: 69 BPM

## 2022-11-09 DIAGNOSIS — R07.9 CHEST PAIN, UNSPECIFIED TYPE: Primary | ICD-10-CM

## 2022-11-09 LAB
ALBUMIN SERPL-MCNC: 4.1 G/DL (ref 3.5–5.2)
ALP BLD-CCNC: 112 U/L (ref 40–129)
ALT SERPL-CCNC: 30 U/L (ref 0–40)
ANION GAP SERPL CALCULATED.3IONS-SCNC: 11 MMOL/L (ref 7–16)
ANION GAP SERPL CALCULATED.3IONS-SCNC: 12 MMOL/L (ref 7–16)
AST SERPL-CCNC: 53 U/L (ref 0–39)
BASOPHILS ABSOLUTE: 0.06 E9/L (ref 0–0.2)
BASOPHILS RELATIVE PERCENT: 1 % (ref 0–2)
BILIRUB SERPL-MCNC: 0.4 MG/DL (ref 0–1.2)
BUN BLDV-MCNC: 15 MG/DL (ref 6–23)
BUN BLDV-MCNC: 15 MG/DL (ref 6–23)
CALCIUM SERPL-MCNC: 9.2 MG/DL (ref 8.6–10.2)
CALCIUM SERPL-MCNC: 9.5 MG/DL (ref 8.6–10.2)
CHLORIDE BLD-SCNC: 103 MMOL/L (ref 98–107)
CHLORIDE BLD-SCNC: 105 MMOL/L (ref 98–107)
CO2: 22 MMOL/L (ref 22–29)
CO2: 26 MMOL/L (ref 22–29)
CREAT SERPL-MCNC: 0.8 MG/DL (ref 0.7–1.2)
CREAT SERPL-MCNC: 0.9 MG/DL (ref 0.7–1.2)
D DIMER: 273 NG/ML DDU
EKG ATRIAL RATE: 86 BPM
EKG P AXIS: 50 DEGREES
EKG P-R INTERVAL: 138 MS
EKG Q-T INTERVAL: 378 MS
EKG QRS DURATION: 78 MS
EKG QTC CALCULATION (BAZETT): 452 MS
EKG R AXIS: -6 DEGREES
EKG T AXIS: 54 DEGREES
EKG VENTRICULAR RATE: 86 BPM
EOSINOPHILS ABSOLUTE: 0.23 E9/L (ref 0.05–0.5)
EOSINOPHILS RELATIVE PERCENT: 3.9 % (ref 0–6)
GFR SERPL CREATININE-BSD FRML MDRD: >60 ML/MIN/1.73
GFR SERPL CREATININE-BSD FRML MDRD: >60 ML/MIN/1.73
GLUCOSE BLD-MCNC: 100 MG/DL (ref 74–99)
GLUCOSE BLD-MCNC: 94 MG/DL (ref 74–99)
HCT VFR BLD CALC: 42.5 % (ref 37–54)
HEMOGLOBIN: 14.2 G/DL (ref 12.5–16.5)
IMMATURE GRANULOCYTES #: 0.01 E9/L
IMMATURE GRANULOCYTES %: 0.2 % (ref 0–5)
LYMPHOCYTES ABSOLUTE: 1.55 E9/L (ref 1.5–4)
LYMPHOCYTES RELATIVE PERCENT: 26 % (ref 20–42)
MCH RBC QN AUTO: 33.9 PG (ref 26–35)
MCHC RBC AUTO-ENTMCNC: 33.4 % (ref 32–34.5)
MCV RBC AUTO: 101.4 FL (ref 80–99.9)
MONOCYTES ABSOLUTE: 1.1 E9/L (ref 0.1–0.95)
MONOCYTES RELATIVE PERCENT: 18.5 % (ref 2–12)
NEUTROPHILS ABSOLUTE: 3.01 E9/L (ref 1.8–7.3)
NEUTROPHILS RELATIVE PERCENT: 50.4 % (ref 43–80)
PDW BLD-RTO: 13.6 FL (ref 11.5–15)
PLATELET # BLD: 179 E9/L (ref 130–450)
PMV BLD AUTO: 9.8 FL (ref 7–12)
POTASSIUM SERPL-SCNC: 3.8 MMOL/L (ref 3.5–5)
POTASSIUM SERPL-SCNC: 5.7 MMOL/L (ref 3.5–5)
RBC # BLD: 4.19 E12/L (ref 3.8–5.8)
REASON FOR REJECTION: NORMAL
REJECTED TEST: NORMAL
SODIUM BLD-SCNC: 137 MMOL/L (ref 132–146)
SODIUM BLD-SCNC: 142 MMOL/L (ref 132–146)
T4 FREE: 1.24 NG/DL (ref 0.93–1.7)
TOTAL PROTEIN: 6.9 G/DL (ref 6.4–8.3)
TROPONIN, HIGH SENSITIVITY: 18 NG/L (ref 0–11)
TROPONIN, HIGH SENSITIVITY: 18 NG/L (ref 0–11)
TSH SERPL DL<=0.05 MIU/L-ACNC: 0.83 UIU/ML (ref 0.27–4.2)
WBC # BLD: 6 E9/L (ref 4.5–11.5)

## 2022-11-09 PROCEDURE — 84484 ASSAY OF TROPONIN QUANT: CPT

## 2022-11-09 PROCEDURE — 93005 ELECTROCARDIOGRAM TRACING: CPT | Performed by: STUDENT IN AN ORGANIZED HEALTH CARE EDUCATION/TRAINING PROGRAM

## 2022-11-09 PROCEDURE — 85025 COMPLETE CBC W/AUTO DIFF WBC: CPT

## 2022-11-09 PROCEDURE — 71045 X-RAY EXAM CHEST 1 VIEW: CPT

## 2022-11-09 PROCEDURE — 6370000000 HC RX 637 (ALT 250 FOR IP): Performed by: STUDENT IN AN ORGANIZED HEALTH CARE EDUCATION/TRAINING PROGRAM

## 2022-11-09 PROCEDURE — 6370000000 HC RX 637 (ALT 250 FOR IP): Performed by: INTERNAL MEDICINE

## 2022-11-09 PROCEDURE — 71275 CT ANGIOGRAPHY CHEST: CPT

## 2022-11-09 PROCEDURE — 80048 BASIC METABOLIC PNL TOTAL CA: CPT

## 2022-11-09 PROCEDURE — G0378 HOSPITAL OBSERVATION PER HR: HCPCS

## 2022-11-09 PROCEDURE — 6360000004 HC RX CONTRAST MEDICATION: Performed by: RADIOLOGY

## 2022-11-09 PROCEDURE — 99285 EMERGENCY DEPT VISIT HI MDM: CPT

## 2022-11-09 PROCEDURE — 80053 COMPREHEN METABOLIC PANEL: CPT

## 2022-11-09 PROCEDURE — 84443 ASSAY THYROID STIM HORMONE: CPT

## 2022-11-09 PROCEDURE — 99205 OFFICE O/P NEW HI 60 MIN: CPT | Performed by: INTERNAL MEDICINE

## 2022-11-09 PROCEDURE — 84439 ASSAY OF FREE THYROXINE: CPT

## 2022-11-09 PROCEDURE — 36415 COLL VENOUS BLD VENIPUNCTURE: CPT

## 2022-11-09 PROCEDURE — 85378 FIBRIN DEGRADE SEMIQUANT: CPT

## 2022-11-09 PROCEDURE — 93010 ELECTROCARDIOGRAM REPORT: CPT | Performed by: INTERNAL MEDICINE

## 2022-11-09 RX ORDER — ISOSORBIDE MONONITRATE 30 MG/1
30 TABLET, EXTENDED RELEASE ORAL DAILY
Qty: 30 TABLET | Refills: 3 | Status: SHIPPED | OUTPATIENT
Start: 2022-11-10 | End: 2022-12-01 | Stop reason: CLARIF

## 2022-11-09 RX ORDER — ACETAMINOPHEN 500 MG
500 TABLET ORAL ONCE
Status: COMPLETED | OUTPATIENT
Start: 2022-11-09 | End: 2022-11-09

## 2022-11-09 RX ORDER — ISOSORBIDE MONONITRATE 30 MG/1
30 TABLET, EXTENDED RELEASE ORAL DAILY
Status: DISCONTINUED | OUTPATIENT
Start: 2022-11-09 | End: 2022-11-09 | Stop reason: HOSPADM

## 2022-11-09 RX ORDER — METOPROLOL SUCCINATE 25 MG/1
25 TABLET, EXTENDED RELEASE ORAL DAILY
Status: DISCONTINUED | OUTPATIENT
Start: 2022-11-09 | End: 2022-11-09 | Stop reason: HOSPADM

## 2022-11-09 RX ORDER — M-VIT,TX,IRON,MINS/CALC/FOLIC 27MG-0.4MG
1 TABLET ORAL DAILY
COMMUNITY

## 2022-11-09 RX ORDER — NITROGLYCERIN 0.4 MG/1
0.4 TABLET SUBLINGUAL EVERY 5 MIN PRN
Status: DISCONTINUED | OUTPATIENT
Start: 2022-11-09 | End: 2022-11-09 | Stop reason: HOSPADM

## 2022-11-09 RX ORDER — METOPROLOL SUCCINATE 25 MG/1
25 TABLET, EXTENDED RELEASE ORAL DAILY
Qty: 30 TABLET | Refills: 3 | Status: SHIPPED | OUTPATIENT
Start: 2022-11-10

## 2022-11-09 RX ORDER — NITROGLYCERIN 0.4 MG/1
0.4 TABLET SUBLINGUAL EVERY 5 MIN PRN
Qty: 25 TABLET | Refills: 3 | Status: SHIPPED | OUTPATIENT
Start: 2022-11-09

## 2022-11-09 RX ADMIN — IOPAMIDOL 75 ML: 755 INJECTION, SOLUTION INTRAVENOUS at 06:19

## 2022-11-09 RX ADMIN — ISOSORBIDE MONONITRATE 30 MG: 30 TABLET, EXTENDED RELEASE ORAL at 10:49

## 2022-11-09 RX ADMIN — METOPROLOL SUCCINATE 25 MG: 25 TABLET, FILM COATED, EXTENDED RELEASE ORAL at 10:50

## 2022-11-09 RX ADMIN — ACETAMINOPHEN 500 MG: 500 TABLET ORAL at 05:53

## 2022-11-09 ASSESSMENT — ENCOUNTER SYMPTOMS
SORE THROAT: 0
COUGH: 0
BACK PAIN: 0
ABDOMINAL PAIN: 0
SINUS PRESSURE: 0
SHORTNESS OF BREATH: 0
VOMITING: 0
EYE PAIN: 0
WHEEZING: 0
NAUSEA: 0
DIARRHEA: 0
EYE REDNESS: 0
EYE DISCHARGE: 0

## 2022-11-09 ASSESSMENT — PAIN - FUNCTIONAL ASSESSMENT: PAIN_FUNCTIONAL_ASSESSMENT: NONE - DENIES PAIN
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
## 2022-11-09 NOTE — CONSULTS
Patient seen and examined. Chart, labs, imaging studies, EKG and rhythm strips reviewed. Full consult to follow. Consulted for CP    IMPRESSION:  CP, doubt cardiac, lasted two minutes and resolved and has not returned. Negative stress in 2019. No acute ischemic EKG changes. Troponin 18 x 2. WPW s/p ablation in 2009  HTN  HLD  Mild MR, moderate TR, and mild PHTN on TTE 7/2019 (EF 60%). Restrictive lung disease  Von Willebrand's type blood dyscrasia  Crohn's disease on Pentasa  Chronic back pain. Lumbar radiculopathy s/p injection 10/2021  BPH with urinary urgency  [facility]      PLAN:   Stress suggested, patient declined. Will discuss with his primary cardiologist on follow-up. Will start empiric treatment with low dose BB, oral nitrates, and SL nitroglycerin prn in the interim.   Rest of home cardiac medications the same  Okay for discharge from cardiology standpoint  Cardiology will sign off, please call if needed    Electronically signed by Jony Delgado MD on 11/9/2022 at 10:17 AM

## 2022-11-09 NOTE — ED NOTES
Nurse to nurse called to the unit. SBAR faxed. Patient in transport to go to the unit.       Graciella Moritz, RN  11/09/22 1292

## 2022-11-09 NOTE — H&P
[facility]  History and Physical      CHIEF COMPLAINT:    Chief Complaint   Patient presents with    Chest Pain     Sudden onset of Chest pain 45 minutes PTA. Woke patient up from a sound sleep. Denies pain upon arrivial.         Patient of Shashi Schultz MD presents with:  Chest pain    History of Present Illness:   Patient is a 68-year-old male with past medical history of carotid artery occlusion, Crohn's disease, hypertension, mitral regurg, osteopenia, osteoporosis, tricuspid regurg, and WPW. Patient presented to the ED with a sudden onset of chest pain approximately 45 minutes prior to arrival.  Patient states the left upper quadrant pain radiating to the epigastric area that lasted approximately 2 minutes and has completely resolved. Patient is alert and oriented on examination. Patient denies any chest pain, shortness of breath, fever, chills, headache, dizziness, blurred vision, and abdominal pain. Patient does admit to taking MICHELLE Crumpton that was prescribed for his sciatica and it caused his heart to race he went up into the 180s he stopped taking it after 3 days and palpitation heart racing resolved. ER work-up was essentially negative patient was admitted observation for further testing and treatment. REVIEW OF SYSTEMS:  Pertinent negatives are above in HPI. 10 point ROS otherwise negative.       Past Medical History:   Diagnosis Date    Carotid artery occlusion     Chronic pain of left knee     Crohn disease (HCC)     Hypertension     Mitral regurgitation     Osteopenia     Osteoporosis     Tricuspid regurgitation     WPW (Donato-Parkinson-White syndrome)     had ablation to correct         Past Surgical History:   Procedure Laterality Date    ABLATION OF DYSRHYTHMIC FOCUS  2009    wpw    COLECTOMY      x 3  Due to Crohns Disease  Age 52, Age 46 and 5    INGUINAL HERNIA REPAIR Right     Undecended testicle    PAIN MANAGEMENT PROCEDURE N/A 10/14/2021    RIGHT L 4-5 AND L 5-S1 TRANSFORAMINAL EPIDURAL STEROID INJECTION (21537)(wants last) performed by Corey Caballero DO at [address] [place] [address]       Medications Prior to Admission:    Not in a hospital admission. Note that the patient's home medications were reviewed and the above list is accurate to the best of my knowledge at the time of the exam.    Allergies:    Latanoprost, Sumycin [tetracycline hcl], and Tetracyclines & related    Social History:    reports that he has never smoked. He has been exposed to tobacco smoke. He has never used smokeless tobacco. He reports that he does not drink alcohol and does not use drugs. Family History:   family history includes Cancer in his father; Heart Disease in his mother. PHYSICAL EXAM:    Vitals:  BP (!) 140/71   Pulse 85   Temp 98 °F (36.7 °C) (Oral)   Resp 16   Ht 5' 7\" (1.702 m)   Wt 155 lb (70.3 kg)   SpO2 95%   BMI 24.28 kg/m²       General appearance: NAD, conversant, pleasant  Eyes: Sclerae anicteric, PERRLA  HEENT: AT/NC, MMM  Neck: FROM, supple, no thyromegaly  Lymph: No cervical / supraclavicular lymphadenopathy  Lungs: Clear to auscultation, WOB normal  CV: RRR, no MRGs, no lower extremity edema  Abdomen: Soft, non-tender; no masses or HSM, +BS  Extremities: FROM without synovitis. No clubbing or cyanosis of the hands. Skin: no rash, induration, lesions, or ulcers  Psych: Calm and cooperative. Normal judgement and insight. Normal mood and affect. Neuro: Alert and interactive, face symmetric, speech fluent. LABS:  All labs reviewed.   Of note:  CBC with Differential:    Lab Results   Component Value Date/Time    WBC 6.0 11/09/2022 03:40 AM    RBC 4.19 11/09/2022 03:40 AM    HGB 14.2 11/09/2022 03:40 AM    HCT 42.5 11/09/2022 03:40 AM     11/09/2022 03:40 AM    .4 11/09/2022 03:40 AM    MCH 33.9 11/09/2022 03:40 AM    MCHC 33.4 11/09/2022 03:40 AM    RDW 13.6 11/09/2022 03:40 AM    SEGSPCT 75 01/19/2014 11:25 AM    LYMPHOPCT 26.0 11/09/2022 03:40 AM    MONOPCT 18.5 11/09/2022 03:40 AM    BASOPCT 1.0 11/09/2022 03:40 AM    MONOSABS 1.10 11/09/2022 03:40 AM    LYMPHSABS 1.55 11/09/2022 03:40 AM    EOSABS 0.23 11/09/2022 03:40 AM    BASOSABS 0.06 11/09/2022 03:40 AM     CMP:    Lab Results   Component Value Date/Time     11/09/2022 04:39 AM    K 3.8 11/09/2022 04:39 AM    K 4.4 06/28/2022 01:09 AM     11/09/2022 04:39 AM    CO2 22 11/09/2022 04:39 AM    BUN 15 11/09/2022 04:39 AM    CREATININE 0.9 11/09/2022 04:39 AM    GFRAA >60 06/28/2022 01:09 AM    LABGLOM >60 11/09/2022 04:39 AM    GLUCOSE 94 11/09/2022 04:39 AM    PROT 6.9 11/09/2022 03:40 AM    LABALBU 4.1 11/09/2022 03:40 AM    CALCIUM 9.5 11/09/2022 04:39 AM    BILITOT 0.4 11/09/2022 03:40 AM    ALKPHOS 112 11/09/2022 03:40 AM    AST 53 11/09/2022 03:40 AM    ALT 30 11/09/2022 03:40 AM       Imaging:  CXR:  No acute disease    CTA Pulmonary:  No evidence of pulmonary embolism or acute pulmonary abnormality    EKG:  I've personally reviewed the patient's EKG:  NSR    Telemetry:  I've personally reviewed the patient's telemetry:  NSR    ASSESSMENT/PLAN:  Principal Problem:    Chest pain  Resolved Problems:    * No resolved hospital problems. *    80-year-old male with a history of mitral regurg and tricuspid regurg and WPW presented to the ED with complaints of chest pain that lasted approximately 2 minutes and is admitted observation with    Chest pain  Monitor troponins-flat 18  Cardiology consulted-medications adjustment-signed off  Patient to follow-up with cardiology for outpatient work-up  2D echo-to be done outpatient    Medication for other comorbidities continue as appropriate dose adjustment as necessary. DVT prophylaxis  PT OT  Discharge planning  Case discussed with attending and agreed upon plan of care.       Code status: Full  Requires inpatient level of care  Kell Dumont APRN - CNP    11:50 AM  11/9/2022     Patient was seen by cardiology and will follow with them outpatient. Above note edited to reflect my thoughts     I personally saw, examined and provided care for the patient. Radiographs, labs and medication list were reviewed by me independently. The case was discussed in detail and plans for care were established. Review of Lizbet BRUNO- CNP, documentation was conducted and revisions were made as appropriate directly by me. I agree with the above documented exam, problem list, and plan of care.      Oriana Sales MD  11/9/2022

## 2022-11-09 NOTE — ED PROVIDER NOTES
Department of Emergency Medicine   ED  Provider Note  Admit Date/RoomTime: 11/9/2022  3:23 AM  ED Room: [facility] hospital          History of Present Illness:  11/9/22, Time: 3:40 AM EST  Chief Complaint   Patient presents with    Chest Pain     Sudden onset of Chest pain 45 minutes PTA. Woke patient up from a sound sleep. Denies pain upon Heather Herrera is a 80 y.o. male presenting to the ED for chest pain, beginning approximately 130. The complaint has been intermittent, moderate in severity, and worsened by nothing. Patient states that the pain woke him up out of his sleep. He describes it as a dull pain in the center of his chest.  He states that he is never had this previously. It has resolved. He otherwise denies any other accompanying symptoms including but limited to fevers, shortness of breath, nausea, vomiting, abdominal pain, lower extremity swelling or pain. He states that he is on any blood thinners. He did receive aspirin prior to arrival. He mentions he is currently being evaluated for exertional shortness of breath by his cardiolgist. Dr Tati Madrid. Review of Systems   Constitutional:  Negative for chills and fever. HENT:  Negative for ear pain, sinus pressure and sore throat. Eyes:  Negative for pain, discharge and redness. Respiratory:  Negative for cough, shortness of breath and wheezing. Cardiovascular:  Positive for chest pain. Negative for leg swelling. Gastrointestinal:  Negative for abdominal pain, diarrhea, nausea and vomiting. Genitourinary:  Negative for dysuria and frequency. Musculoskeletal:  Negative for arthralgias and back pain. Skin:  Negative for rash and wound. Neurological:  Negative for weakness and headaches. Hematological:  Negative for adenopathy.    All other systems reviewed and are negative.       --------------------------------------------- PAST HISTORY ---------------------------------------------  Past Medical History:  has a past medical history of Carotid artery occlusion, Chronic pain of left knee, Crohn disease ([address] Hypertension, Mitral regurgitation, Osteopenia, Osteoporosis, Tricuspid regurgitation, and WPW (Donato-Parkinson-White syndrome). Past Surgical History:  has a past surgical history that includes ablation of dysrhythmic focus (2009); Inguinal hernia repair (Right); colectomy; and Pain management procedure (N/A, 10/14/2021). Social History:  reports that he has never smoked. He has been exposed to tobacco smoke. He has never used smokeless tobacco. He reports that he does not drink alcohol and does not use drugs. Family History: family history includes Cancer in his father; Heart Disease in his mother. . Unless otherwise noted, family history is non contributory    The patients home medications have been reviewed. Allergies: Latanoprost, Sumycin [tetracycline hcl], and Tetracyclines & related        ---------------------------------------------------PHYSICAL EXAM--------------------------------------    Physical Exam  Vitals and nursing note reviewed. Constitutional:       General: He is not in acute distress. Appearance: He is well-developed. HENT:      Head: Normocephalic and atraumatic. Eyes:      Conjunctiva/sclera: Conjunctivae normal.   Cardiovascular:      Rate and Rhythm: Normal rate and regular rhythm. Heart sounds: Normal heart sounds. No murmur heard. Pulmonary:      Effort: Pulmonary effort is normal. No respiratory distress. Breath sounds: Normal breath sounds. No wheezing or rales. Abdominal:      General: Bowel sounds are normal.      Palpations: Abdomen is soft. Tenderness: There is no abdominal tenderness. There is no guarding or rebound. Musculoskeletal:         General: No tenderness or deformity. Cervical back: Normal range of motion and neck supple. Right lower leg: No edema. Left lower leg: No edema. Skin:     General: Skin is warm and dry. Neurological:      Mental Status: He is alert and oriented to person, place, and time. Cranial Nerves: No cranial nerve deficit. Coordination: Coordination normal.          -------------------------------------------------- RESULTS -------------------------------------------------  I have personally reviewed all laboratory and imaging results for this patient. Results are listed below.      LABS: (Lab results interpreted by me)  Results for orders placed or performed during the hospital encounter of 11/09/22   CBC with Auto Differential   Result Value Ref Range    WBC 6.0 4.5 - 11.5 E9/L    RBC 4.19 3.80 - 5.80 E12/L    Hemoglobin 14.2 12.5 - 16.5 g/dL    Hematocrit 42.5 37.0 - 54.0 %    .4 (H) 80.0 - 99.9 fL    MCH 33.9 26.0 - 35.0 pg    MCHC 33.4 32.0 - 34.5 %    RDW 13.6 11.5 - 15.0 fL    Platelets 766 989 - 170 E9/L    MPV 9.8 7.0 - 12.0 fL    Neutrophils % 50.4 43.0 - 80.0 %    Immature Granulocytes % 0.2 0.0 - 5.0 %    Lymphocytes % 26.0 20.0 - 42.0 %    Monocytes % 18.5 (H) 2.0 - 12.0 %    Eosinophils % 3.9 0.0 - 6.0 %    Basophils % 1.0 0.0 - 2.0 %    Neutrophils Absolute 3.01 1.80 - 7.30 E9/L    Immature Granulocytes # 0.01 E9/L    Lymphocytes Absolute 1.55 1.50 - 4.00 E9/L    Monocytes Absolute 1.10 (H) 0.10 - 0.95 E9/L    Eosinophils Absolute 0.23 0.05 - 0.50 E9/L    Basophils Absolute 0.06 0.00 - 0.20 E9/L   CMP   Result Value Ref Range    Sodium 142 132 - 146 mmol/L    Potassium 5.7 (H) 3.5 - 5.0 mmol/L    Chloride 105 98 - 107 mmol/L    CO2 26 22 - 29 mmol/L    Anion Gap 11 7 - 16 mmol/L    Glucose 100 (H) 74 - 99 mg/dL    BUN 15 6 - 23 mg/dL    Creatinine 0.8 0.7 - 1.2 mg/dL    Est, Glom Filt Rate >60 >=60 mL/min/1.73    Calcium 9.2 8.6 - 10.2 mg/dL    Total Protein 6.9 6.4 - 8.3 g/dL    Albumin 4.1 3.5 - 5.2 g/dL    Total Bilirubin 0.4 0.0 - 1.2 mg/dL    Alkaline Phosphatase 112 40 - 129 U/L    ALT 30 0 - 40 U/L    AST 53 (H) 0 - 39 U/L   SPECIMEN REJECTION   Result Value Ref Range Rejected Test DIMER     Reason for Rejection see below    BMP   Result Value Ref Range    Sodium 137 132 - 146 mmol/L    Potassium 3.8 3.5 - 5.0 mmol/L    Chloride 103 98 - 107 mmol/L    CO2 22 22 - 29 mmol/L    Anion Gap 12 7 - 16 mmol/L    Glucose 94 74 - 99 mg/dL    BUN 15 6 - 23 mg/dL    Creatinine 0.9 0.7 - 1.2 mg/dL    Est, Glom Filt Rate >60 >=60 mL/min/1.73    Calcium 9.5 8.6 - 10.2 mg/dL   D-Dimer, Quantitative   Result Value Ref Range    D-Dimer, Quant 273 ng/mL DDU   Troponin   Result Value Ref Range    Troponin, High Sensitivity 18 (H) 0 - 11 ng/L   Troponin   Result Value Ref Range    Troponin, High Sensitivity 18 (H) 0 - 11 ng/L   TSH   Result Value Ref Range    TSH 0.830 0.270 - 4.200 uIU/mL   T4, Free   Result Value Ref Range    T4 Free 1.24 0.93 - 1.70 ng/dL   EKG 12 Lead   Result Value Ref Range    Ventricular Rate 86 BPM    Atrial Rate 86 BPM    P-R Interval 138 ms    QRS Duration 78 ms    Q-T Interval 378 ms    QTc Calculation (Bazett) 452 ms    P Axis 50 degrees    R Axis -6 degrees    T Axis 54 degrees   ,       RADIOLOGY:  Interpreted by Radiologist unless otherwise specified  CTA PULMONARY W CONTRAST   Final Result   No evidence of pulmonary embolism or acute pulmonary abnormality. RECOMMENDATIONS:   Careful clinical correlation and follow up recommended. Unavailable         XR CHEST PORTABLE   Final Result   No acute disease. RECOMMENDATION:   Careful clinical correlation and follow up recommended. ------------------------- NURSING NOTES AND VITALS REVIEWED ---------------------------   The nursing notes within the ED encounter and vital signs as below have been reviewed by myself  /68   Pulse 69   Temp 98 °F (36.7 °C) (Oral)   Resp 18   Ht 5' 7\" (1.702 m)   Wt 155 lb (70.3 kg)   SpO2 95%   BMI 24.28 kg/m²     Oxygen Saturation Interpretation: Normal      The patients available past medical records and past encounters were reviewed. ------------------------------ ED COURSE/MEDICAL DECISION MAKING----------------------  Medications   acetaminophen (TYLENOL) tablet 500 mg (500 mg Oral Given 11/9/22 0553)   iopamidol (ISOVUE-370) 76 % injection 75 mL (75 mLs IntraVENous Given 11/9/22 0619)            Medical Decision Making:     ED Course as of 11/09/22 2103 Wed Nov 09, 2022   0337 Patient presents with chest pain. Ddimer was elevated. CTA pulmonary arteries was ordered and is pending. Initial troponin was 18. Repeat was ordered and is pending. CBC was unremarkable. CMP did show an elevated potassium but was hemolyzed. Repeat BMP was within normal limits. Patient was handed off to Dr. Dorena Lesch with plan for repeat troponin and admission for cardiac evaluation given patient's recent history and risk factors. [BB]   [id number] EKG shows normal sinus rhythm with ventricular rate of 86 bpm.  Normal axis. Does not meet STEMI criteria. Comparable to previous EKG on 9/23/2022. As interpreted by myself ED physician. [BB]   1648 Patient was admitted to the hospital by Dr. Mirella Santiago at this time. [JS]      ED Course User Index  [BB] Randy Nelson DO  [JS] Campos Yap DO        Re-Evaluations:  Patient was reevaluted and continued to be stable. This patient's ED course included: a personal history and physicial examination, re-evaluation prior to disposition, IV medications, and cardiac monitoring    This patient has remained hemodynamically stable during their ED course. Consultations:  None      Critical Care: None       Counseling: The emergency provider has spoken with the patient and discussed todays results, in addition to providing specific details for the plan of care and counseling regarding the diagnosis and prognosis. Questions are answered at this time and they are agreeable with the plan.       --------------------------------- IMPRESSION AND DISPOSITION ---------------------------------    IMPRESSION  1.  Chest pain, unspecified type        DISPOSITION  Disposition: Admit to telemetry  Patient condition is stable    Patient was seen and evaluated by both myself and June Kovacs MD.      NOTE: This report was transcribed using voice recognition software.  Every effort was made to ensure accuracy; however, inadvertent computerized transcription errors may be present           Sydnee Cortes DO  Resident  11/09/22 2866

## 2022-11-11 ENCOUNTER — TELEPHONE (OUTPATIENT)
Dept: ADMINISTRATIVE | Age: 87
End: 2022-11-11

## 2022-11-11 NOTE — TELEPHONE ENCOUNTER
[phone] (ESTELA)    Dona Dhillon DO  External - Family Practice    Payor: [facility] MEDICARE COMPLETE / Plan: [id number] / Product Type: MEDADV    From: Zachery Kinney MD  Sent: 12/20/2017   3:16 PM    RIGHT TKA  [id number]  Surgery scheduling requirements include:  Date of Surgery: Elective- anytime  Facility: [facility]  Admission Type: Admit to inpatient  Time Needed: 100 min  Anesthesia: Femoral OR Adductor Nerve Block (SINGLE SHOT) and General  Surgical Assist: yes, surgical assist and yes, ROSSY OR MARIA  Special Equipment: Biomet Primary Knee  Nickel Allergy?: PLEASE ASK  Consent: At Facility  Blood Donation: none  Pathway class: yes  PREHAB needed? yes  Preop history and physical: Yes, with patient's PCP  Preop testing: CBC, BMP, CXR, EKG, INR and Pre Surgical Staph Aureus Screen with MRSA if Indicated (nasal for TKA, nasal and groin for EARLINE) (VPR061 orderable)  Special preop instructions: Bilateral TEDS and SCD's, shave operative site preop, capped IV.  Preop antibiotics: If <150# Ancef 1 gm IV, if >150# Ancef 2 gm IV less than 30 minutes before surgery. If patient has allergy to PCN, then give Vancomycin 1 gram IVPB over 60 minutes (not more than 90 minutes prior to surgery)  Service to Anticoagulation Clinic needed postop: Yes   Schedule postop appointment for 2 weeks postop.     Pt called to schedule HFU w/ [address] 11/9  please advise pt at [phone]

## 2022-11-17 ENCOUNTER — HOSPITAL ENCOUNTER (OUTPATIENT)
Dept: CARDIOLOGY | Age: 87
Discharge: HOME | End: 2022-11-17
Payer: MEDICARE

## 2022-11-17 DIAGNOSIS — R60.9 DEPENDENT EDEMA: ICD-10-CM

## 2022-11-17 DIAGNOSIS — R68.89 OTHER GENERAL SYMPTOMS AND SIGNS: ICD-10-CM

## 2022-11-17 LAB
LV EF: 60 %
LVEF MODALITY: NORMAL

## 2022-11-17 PROCEDURE — 93306 TTE W/DOPPLER COMPLETE: CPT

## 2022-11-18 ENCOUNTER — TELEPHONE (OUTPATIENT)
Dept: CARDIOLOGY CLINIC | Age: 87
End: 2022-11-18

## 2022-11-18 NOTE — TELEPHONE ENCOUNTER
----- Message from Ramírez Dickinson MD sent at 11/18/2022 11:54 AM EST -----  Please let patient know:  echo looks good. No change from study in 2019.

## 2022-12-01 ENCOUNTER — OFFICE VISIT (OUTPATIENT)
Dept: CARDIOLOGY CLINIC | Age: 87
End: 2022-12-01
Payer: MEDICARE

## 2022-12-01 VITALS
SYSTOLIC BLOOD PRESSURE: 138 MMHG | BODY MASS INDEX: 23.86 KG/M2 | RESPIRATION RATE: 14 BRPM | HEIGHT: 67 IN | HEART RATE: 86 BPM | DIASTOLIC BLOOD PRESSURE: 70 MMHG | WEIGHT: 152 LBS

## 2022-12-01 DIAGNOSIS — I45.6 WPW (WOLFF-PARKINSON-WHITE SYNDROME): ICD-10-CM

## 2022-12-01 DIAGNOSIS — I34.0 NONRHEUMATIC MITRAL VALVE REGURGITATION: ICD-10-CM

## 2022-12-01 DIAGNOSIS — I10 ESSENTIAL HYPERTENSION: ICD-10-CM

## 2022-12-01 DIAGNOSIS — I36.1 NONRHEUMATIC TRICUSPID VALVE REGURGITATION: ICD-10-CM

## 2022-12-01 DIAGNOSIS — R07.2 PRECORDIAL PAIN: Primary | ICD-10-CM

## 2022-12-01 PROCEDURE — 1123F ACP DISCUSS/DSCN MKR DOCD: CPT | Performed by: INTERNAL MEDICINE

## 2022-12-01 PROCEDURE — G8427 DOCREV CUR MEDS BY ELIG CLIN: HCPCS | Performed by: INTERNAL MEDICINE

## 2022-12-01 PROCEDURE — 93000 ELECTROCARDIOGRAM COMPLETE: CPT | Performed by: INTERNAL MEDICINE

## 2022-12-01 PROCEDURE — 1036F TOBACCO NON-USER: CPT | Performed by: INTERNAL MEDICINE

## 2022-12-01 PROCEDURE — 99214 OFFICE O/P EST MOD 30 MIN: CPT | Performed by: INTERNAL MEDICINE

## 2022-12-01 PROCEDURE — G8484 FLU IMMUNIZE NO ADMIN: HCPCS | Performed by: INTERNAL MEDICINE

## 2022-12-01 PROCEDURE — G8420 CALC BMI NORM PARAMETERS: HCPCS | Performed by: INTERNAL MEDICINE

## 2022-12-01 RX ORDER — LISINOPRIL 20 MG/1
20 TABLET ORAL DAILY
COMMUNITY
End: 2022-12-01 | Stop reason: SDUPTHER

## 2022-12-01 RX ORDER — LISINOPRIL 20 MG/1
20 TABLET ORAL DAILY
Qty: 90 TABLET | Refills: 3 | Status: SHIPPED | OUTPATIENT
Start: 2022-12-01

## 2022-12-01 RX ORDER — GABAPENTIN 100 MG/1
CAPSULE ORAL
COMMUNITY
Start: 2022-10-07 | End: 2022-12-01 | Stop reason: CLARIF

## 2022-12-01 NOTE — PROGRESS NOTES
Patient Active Problem List   Diagnosis    WPW (Donato-Parkinson-White syndrome) - s/p ablation    Crohn's disease (HCC)    Mitral regurgitation - mild     Tricuspid regurgitation - moderate     Essential hypertension    Chest pain    Atrial tachycardia (HCC)    Lumbar radiculopathy       Current Outpatient Medications   Medication Sig Dispense Refill    lisinopril (PRINIVIL;ZESTRIL) 20 MG tablet Take 1 tablet by mouth daily 90 tablet 3    Calcium Carb-Cholecalciferol (CALCIUM CARBONATE-VITAMIN D3 PO) Take 1,200 mg by mouth daily      Multiple Vitamins-Minerals (THERAPEUTIC MULTIVITAMIN-MINERALS) tablet Take 1 tablet by mouth daily      metoprolol succinate (TOPROL XL) 25 MG extended release tablet Take 1 tablet by mouth daily 30 tablet 3    nitroGLYCERIN (NITROSTAT) 0.4 MG SL tablet Place 1 tablet under the tongue every 5 minutes as needed for Chest pain up to max of 3 total doses. If no relief after 1 dose, call 911. 25 tablet 3    Netarsudil Dimesylate 0.02 % SOLN Place 1 drop into the left eye every evening      amLODIPine (NORVASC) 5 MG tablet Take 5 mg by mouth daily      atorvastatin (LIPITOR) 40 MG tablet Take 20 mg by mouth daily      Lycopene 10 MG CAPS Take 20 mg by mouth daily      brimonidine (ALPHAGAN) 0.2 % ophthalmic solution Place 1 drop into the left eye in the morning and at bedtime      Multiple Vitamins-Minerals (PRESERVISION/LUTEIN) CAPS Take 1 capsule by mouth 2 times daily       vitamin D (CHOLECALCIFEROL) 50 MCG (2000 UT) TABS tablet Take 2,000 Units by mouth daily      mesalamine (PENTASA) 250 MG CR capsule Take 1,500 mg by mouth 3 times daily       No current facility-administered medications for this visit. CC:    Patient is seen post ER evaluation for chest pain. Also for elevated blood pressure and in follow up for:  1. Precordial pain    2. Essential hypertension    3. WPW (Donato-Parkinson-White syndrome) - s/p ablation    4.  Nonrheumatic tricuspid valve regurgitation - moderate 5. Nonrheumatic mitral valve regurgitation - mild        HPI:  Recent ER evaluation for chest pain. Pain woke him up from sleep and he was transported by paramedics to the ER. He has had no further chest pain. Echocardiogram was performed that revealed normal LV function. Also, recent difficulties with elevated blood pressure readings. ROS:   General: No unusual weight gain, no change in exercise tolerance  Skin: No rash or itching  EENT: No vision changes or nosebleeds  Cardiovascular: No orthopnea or paroxysmal nocturnal dyspnea  Respiratory: No cough or hemoptysis  Gastrointestinal: No hematemesis or recent changes in bowel habits  Genitourinary: No hematuria, urgency or frequency  Musculoskeletal: No muscular weakness or joint swelling   Neurologic / Psychiatric: No incoordination or convulsions  Allergic / Immunologic/ Lymphatic / Endocrine: No anemia or bleeding tendency        Social History     Socioeconomic History    Marital status:      Spouse name: Not on file    Number of children: Not on file    Years of education: Not on file    Highest education level: Not on file   Occupational History    Not on file   Tobacco Use    Smoking status: Never     Passive exposure: Yes    Smokeless tobacco: Never    Tobacco comments:     55 year due to second hand from wife.    Vaping Use    Vaping Use: Never used   Substance and Sexual Activity    Alcohol use: No    Drug use: No    Sexual activity: Not Currently   Other Topics Concern    Not on file   Social History Narrative    Not on file     Social Determinants of Health     Financial Resource Strain: Not on file   Food Insecurity: Not on file   Transportation Needs: Not on file   Physical Activity: Not on file   Stress: Not on file   Social Connections: Not on file   Intimate Partner Violence: Not on file   Housing Stability: Not on file       Past Medical History:   Diagnosis Date    Carotid artery occlusion     Chronic pain of left knee     [facility]     Hypertension     Mitral regurgitation     Osteopenia     Osteoporosis     Tricuspid regurgitation     WPW (Donato-Parkinson-White syndrome)     had ablation to correct       PHYSICAL EXAM:  CONSTITUTIONAL:  Well developed, well nourished    Vitals:    12/01/22 0806   BP: 138/70   Pulse: 86   Resp: 14   Weight: 152 lb (68.9 kg)   Height: 5' 7\" (1.702 m)     HEAD & FACE: Normocephalic. Symmetric. EYES: No xanthelasma. Conjunctivae not injected. EARS, NOSE, MOUTH & THROAT: Good dentition. No oral pallor or cyanosis. NECK: No JVD at 30 degrees. No thyromegaly. RESPIRATORY: Clear to auscultation and percussion in all fields. No use of accessory muscle or intercostal retractions. CARDIOVASCULAR: Regular rate and rhythm. No lifts or thrills on palpitation. Auscultation with normal S1-S2 in intensity and splitting. No carotid bruits. Abdominal aorta not enlarged. Femoral arteries without bruits. Pedal pulses 2+. 1+ bilateral ankle edema. ABDOMEN: Soft without hepatic or splenic enlargement. No tenderness. MUSCULOSKELETAL: No kyphosis or scoliosis of the back. Good muscle strength and tone. No muscle atrophy. Normal gait and ability to undergo exercise stress testing. EXTREMITIES: No clubbing or cyanosis. SKIN: No Xanthomas or ulcerations. NEUROLOGIC: Oriented to time, place and person. Normal mood and affect. LYMPHATIC:  No palpable neck or supraclavicular nodes. No splenomegaly. EKG: Normal sinus rhythm. Short OR No change compared to prior tracing.  ms. ASSESSMENT:                                                     ORDERS:       Diagnosis Orders   1. Precordial pain  NM Cardiac Stress Test Nuclear Imaging    Cardiac Stress Test - w/Pharm      2. Essential hypertension  EKG 12 lead      3. WPW (Donato-Parkinson-White syndrome) - s/p ablation  EKG 12 lead      4. Nonrheumatic tricuspid valve regurgitation - moderate  EKG 12 lead      5.  Nonrheumatic mitral valve regurgitation - mild        New onset of chest pain/elevated BP      PLAN:   See above orders. Reviewed prior records and testing: LDL 49 on 4/9/2019  Assessment of medication compliance. Increase lisinopril to 20 mg daily. Continue metoprolol. Discussed issues that would prompt earlier evaluation. Follow-up office visit in >>> pending above evaluation. Leo Cardoso

## 2022-12-08 ENCOUNTER — TELEPHONE (OUTPATIENT)
Dept: CARDIOLOGY | Age: 87
End: 2022-12-08

## 2022-12-08 NOTE — TELEPHONE ENCOUNTER
Left message to schedule nuclear stress test.  Electronically signed by Raul Carrasco on 12/8/2022 at 1:32 PM

## 2022-12-16 ENCOUNTER — TELEPHONE (OUTPATIENT)
Dept: CARDIOLOGY | Age: 87
End: 2022-12-16

## 2022-12-16 NOTE — TELEPHONE ENCOUNTER
PATIENT CANCELLED STRESS TEST DUE TO RIDE. PATIENT WILL CALL TO RESCHEDULE AFTER THE FIRST OF THE YEAR.

## 2022-12-19 ENCOUNTER — TELEPHONE (OUTPATIENT)
Dept: CARDIOLOGY | Age: 87
End: 2022-12-19

## 2022-12-19 NOTE — TELEPHONE ENCOUNTER
RETURNING PATIENTS  CALL AND LEFT MESSAGE TO RESCHEDULE  STRESS TEST.     Electronically signed by Claude Cartwright on 12/19/2022 at 11:41 AM

## 2023-01-18 ENCOUNTER — TELEPHONE (OUTPATIENT)
Dept: CARDIOLOGY | Age: 88
End: 2023-01-18

## 2023-01-18 NOTE — TELEPHONE ENCOUNTER
Left message to confirm stress test for Friday, 1/20/23 at 0930. Instructions given and medications reviewed. Pt instructed to hold Toprol XL for 8 hrs prior to the test.  Advised to call with any questions.

## 2023-01-20 ENCOUNTER — HOSPITAL ENCOUNTER (OUTPATIENT)
Dept: CARDIOLOGY | Age: 88
Discharge: HOME | End: 2023-01-20
Payer: MEDICARE

## 2023-01-20 VITALS
BODY MASS INDEX: 23.86 KG/M2 | RESPIRATION RATE: 16 BRPM | WEIGHT: 152 LBS | SYSTOLIC BLOOD PRESSURE: 126 MMHG | HEART RATE: 84 BPM | DIASTOLIC BLOOD PRESSURE: 64 MMHG | HEIGHT: 67 IN

## 2023-01-20 DIAGNOSIS — R07.2 PRECORDIAL PAIN: Primary | ICD-10-CM

## 2023-01-20 DIAGNOSIS — R07.2 PRECORDIAL PAIN: ICD-10-CM

## 2023-01-20 PROCEDURE — 78452 HT MUSCLE IMAGE SPECT MULT: CPT

## 2023-01-20 PROCEDURE — 3430000000 HC RX DIAGNOSTIC RADIOPHARMACEUTICAL: Performed by: INTERNAL MEDICINE

## 2023-01-20 PROCEDURE — A9500 TC99M SESTAMIBI: HCPCS | Performed by: INTERNAL MEDICINE

## 2023-01-20 PROCEDURE — 2580000003 HC RX 258: Performed by: INTERNAL MEDICINE

## 2023-01-20 PROCEDURE — 93017 CV STRESS TEST TRACING ONLY: CPT

## 2023-01-20 PROCEDURE — 6360000002 HC RX W HCPCS: Performed by: INTERNAL MEDICINE

## 2023-01-20 RX ORDER — SODIUM CHLORIDE 0.9 % (FLUSH) 0.9 %
10 SYRINGE (ML) INJECTION PRN
Status: DISCONTINUED | OUTPATIENT
Start: 2023-01-20 | End: 2023-01-21 | Stop reason: HOSPADM

## 2023-01-20 RX ORDER — TECHNETIUM TC-99M SESTAMIBI 1 MG/10ML
10 INJECTION INTRAVENOUS
Status: COMPLETED | OUTPATIENT
Start: 2023-01-20 | End: 2023-01-20

## 2023-01-20 RX ORDER — ACETAMINOPHEN 500 MG
500 TABLET ORAL EVERY 6 HOURS
COMMUNITY

## 2023-01-20 RX ORDER — TECHNETIUM TC-99M SESTAMIBI 1 MG/10ML
30.9 INJECTION INTRAVENOUS
Status: COMPLETED | OUTPATIENT
Start: 2023-01-20 | End: 2023-01-20

## 2023-01-20 RX ADMIN — Medication 10 MILLICURIE: at 09:35

## 2023-01-20 RX ADMIN — SODIUM CHLORIDE, PRESERVATIVE FREE 10 ML: 5 INJECTION INTRAVENOUS at 12:01

## 2023-01-20 RX ADMIN — REGADENOSON 0.4 MG: 0.08 INJECTION, SOLUTION INTRAVENOUS at 12:01

## 2023-01-20 RX ADMIN — SODIUM CHLORIDE, PRESERVATIVE FREE 10 ML: 5 INJECTION INTRAVENOUS at 09:35

## 2023-01-20 RX ADMIN — Medication 30.9 MILLICURIE: at 12:01

## 2023-01-20 RX ADMIN — SODIUM CHLORIDE, PRESERVATIVE FREE 10 ML: 5 INJECTION INTRAVENOUS at 12:00

## 2023-01-20 NOTE — PROCEDURES
[address],[facility] [address], [address] [place], [place] [phone]                Pharmacologic Stress Nuclear Gated SPECT Study    Name: [address] Account Number: [de-identified]    :  [id number]          Sex: male         Date of Study:  2023    Height: 5' 7\" (170.2 cm)         Weight: 152 lb (68.9 kg)     Ordering Provider: Alex Shore MD          PCP: Enedina Flores MD      Cardiologist: Alex Shore MD             Interpreting Physician: Alma Reich DO  _________________________________________________________________________________    Indication:   Detecting the presence and location of coronary artery disease    Clinical History:   Patient has no known history of coronary artery disease. Resting ECG:    Sinus rhythm, 86 bpm.  Normal axis. Nonspecific ST-T waves. Procedure:   Pharmacologic stress testing was performed with regadenoson 0.4 mg for 15 seconds. The heart rate was 86 at baseline and arash to 113 beats during the infusion. This corresponds to 87% of maximum predicted heart rate. The blood pressure at baseline was 126/64 and blood pressure at the end of infusion was 150/70. Blood pressure response was normal during the stress procedure. The patient experienced warmth feeling during the infusion. ECG during the infusion did not change. Occasional PVCs seen in recovery. IMAGING: Myocardial perfusion imaging was performed at rest 30-35 minutes following the intravenous injection of 10.0 mCi of (Tc-Sestamibi) followed by 10 ml of Normal Saline. As per infusion protocol, the patient was injected intravenously with 30.9 mCi of (Tc-Sestamibi) followed by 10 ml of Normal Saline. Gated post-stress tomographic imaging was performed 20-25 minutes after stress. FINDINGS: The overall quality of the study was good.      Left ventricular cavity size was noted to be normal.    Rotational analog analysis demonstrated no patient motion or abnormal extracardiac radioactivity and soft tissue diaphragmatic attenuation. The gated SPECT stress imaging in the short, vertical long, and horizontal long axis demonstrated     A mild defect was present in the basal inferior wall(s) that was  small sized by quantification. The resting images show no change. Gated SPECT left ventricular ejection fraction was calculated to be 68%, with normal myocardial thickening and wall motion. Impression:    ECG during the infusion did not change. The myocardial perfusion imaging was normal with attenuation artifact. Overall left ventricular systolic function was normal without regional wall motion abnormalities. Low risk general pharmacologic stress test.    Thank you for sending your patient to this [employer].      Electronically signed by Keisha Sams DO on 1/20/23 at 4:40 PM EST

## 2023-01-20 NOTE — DISCHARGE INSTRUCTIONS
[address],[facility]      Instructions to Patients    The following are the instructions for patients who have had a procedure in our office today. Patient name: Kameron Maldonado    Radionuclide Activity: 40mCi of 99mTc-Sestamibi    Date Administered: 1/20/2023    Expires: 48 hours after scheduled appointment time      Patient may resume normal activity unless otherwise instructed. Patient may resume medications as normal.  If the need should arise, patient may call [phone] between the hours of 8:00am-5:00pm.  After hours there is at least one physician on-call at all times for those patients needing assistance. Patients may call [phone] and the answering service will direct the patient to one of our physicians for assistance. After the patient's test if they are going to be leaving from an airport in the near future they should take this letter with them to verify the test and radionuclide used for their test.      This letter verifies that the above named bearer received an injection of a radionuclide for medical purpose/usage only.         Electronically signed by Darío Tsang on 1/20/2023 at 11:55 AM

## 2023-01-23 ENCOUNTER — TELEPHONE (OUTPATIENT)
Dept: CARDIOLOGY CLINIC | Age: 88
End: 2023-01-23

## 2023-01-23 NOTE — TELEPHONE ENCOUNTER
----- Message from Jamey Adkins MD sent at 1/20/2023  7:49 PM EST -----  Please let patient know:  stress test normal. OV 6 mos

## 2023-07-25 ENCOUNTER — OFFICE VISIT (OUTPATIENT)
Dept: CARDIOLOGY CLINIC | Age: 88
End: 2023-07-25
Payer: MEDICARE

## 2023-07-25 VITALS
HEIGHT: 66 IN | DIASTOLIC BLOOD PRESSURE: 60 MMHG | HEART RATE: 64 BPM | WEIGHT: 152 LBS | RESPIRATION RATE: 14 BRPM | SYSTOLIC BLOOD PRESSURE: 108 MMHG | BODY MASS INDEX: 24.43 KG/M2

## 2023-07-25 DIAGNOSIS — I36.1 NONRHEUMATIC TRICUSPID VALVE REGURGITATION: ICD-10-CM

## 2023-07-25 DIAGNOSIS — I45.6 WPW (WOLFF-PARKINSON-WHITE SYNDROME): ICD-10-CM

## 2023-07-25 DIAGNOSIS — I34.0 NONRHEUMATIC MITRAL VALVE REGURGITATION: ICD-10-CM

## 2023-07-25 DIAGNOSIS — R60.9 DEPENDENT EDEMA: Primary | ICD-10-CM

## 2023-07-25 DIAGNOSIS — I10 ESSENTIAL HYPERTENSION: ICD-10-CM

## 2023-07-25 PROCEDURE — 99214 OFFICE O/P EST MOD 30 MIN: CPT | Performed by: INTERNAL MEDICINE

## 2023-07-25 PROCEDURE — G8420 CALC BMI NORM PARAMETERS: HCPCS | Performed by: INTERNAL MEDICINE

## 2023-07-25 PROCEDURE — G8427 DOCREV CUR MEDS BY ELIG CLIN: HCPCS | Performed by: INTERNAL MEDICINE

## 2023-07-25 PROCEDURE — 1123F ACP DISCUSS/DSCN MKR DOCD: CPT | Performed by: INTERNAL MEDICINE

## 2023-07-25 PROCEDURE — 1036F TOBACCO NON-USER: CPT | Performed by: INTERNAL MEDICINE

## 2023-07-25 PROCEDURE — 93000 ELECTROCARDIOGRAM COMPLETE: CPT | Performed by: INTERNAL MEDICINE

## 2023-07-25 NOTE — PROGRESS NOTES
Patient Active Problem List   Diagnosis    WPW (Donato-Parkinson-White syndrome) - s/p ablation    Crohn's disease (HCC)    Mitral regurgitation - mild     Tricuspid regurgitation - moderate     Essential hypertension    Chest pain    Atrial tachycardia (HCC)    Lumbar radiculopathy       Current Outpatient Medications   Medication Sig Dispense Refill    bimatoprost (LUMIGAN) 0.01 % SOLN ophthalmic drops Bimatoprost (Lumigan) 0.01 % drops Active 1 DROPS OP Every Day July 20th, 2023 12:00am      acetaminophen (TYLENOL) 500 MG tablet Take 1 tablet by mouth in the morning and 1 tablet at noon and 1 tablet in the evening and 1 tablet before bedtime. lisinopril (PRINIVIL;ZESTRIL) 20 MG tablet Take 1 tablet by mouth daily 90 tablet 3    Calcium Carb-Cholecalciferol (CALCIUM CARBONATE-VITAMIN D3 PO) Take 1,200 mg by mouth daily      Multiple Vitamins-Minerals (THERAPEUTIC MULTIVITAMIN-MINERALS) tablet Take 1 tablet by mouth daily      nitroGLYCERIN (NITROSTAT) 0.4 MG SL tablet Place 1 tablet under the tongue every 5 minutes as needed for Chest pain up to max of 3 total doses. If no relief after 1 dose, call 911. 25 tablet 3    Netarsudil Dimesylate 0.02 % SOLN Place 1 drop into the left eye every evening      amLODIPine (NORVASC) 5 MG tablet Take 1 tablet by mouth daily      atorvastatin (LIPITOR) 40 MG tablet Take 0.5 tablets by mouth daily      Lycopene 10 MG CAPS Take 20 mg by mouth daily      brimonidine (ALPHAGAN) 0.2 % ophthalmic solution Place 1 drop into the left eye in the morning and at bedtime      Multiple Vitamins-Minerals (PRESERVISION/LUTEIN) CAPS Take 1 capsule by mouth 2 times daily       vitamin D (CHOLECALCIFEROL) 50 MCG (2000 UT) TABS tablet Take 1 tablet by mouth daily      mesalamine (PENTASA) 250 MG CR capsule Take 6 capsules by mouth 3 times daily       No current facility-administered medications for this visit.        CC:    Patient is seen for new problem of bilateral lower leg edema and in

## 2023-10-09 ENCOUNTER — LAB REQUISITION (OUTPATIENT)
Dept: LAB | Facility: HOSPITAL | Age: 88
End: 2023-10-09
Payer: MEDICARE

## 2023-10-09 DIAGNOSIS — D48.5 NEOPLASM OF UNCERTAIN BEHAVIOR OF SKIN: ICD-10-CM

## 2023-10-09 PROCEDURE — 88305 TISSUE EXAM BY PATHOLOGIST: CPT | Performed by: DERMATOLOGY

## 2023-10-09 PROCEDURE — 88305 TISSUE EXAM BY PATHOLOGIST: CPT

## 2023-10-23 LAB
LABORATORY COMMENT REPORT: NORMAL
PATH REPORT.FINAL DX SPEC: NORMAL
PATH REPORT.GROSS SPEC: NORMAL
PATH REPORT.MICROSCOPIC SPEC OTHER STN: NORMAL
PATH REPORT.RELEVANT HX SPEC: NORMAL
PATH REPORT.TOTAL CANCER: NORMAL

## 2023-10-24 PROCEDURE — 88305 TISSUE EXAM BY PATHOLOGIST: CPT | Performed by: DERMATOLOGY

## 2023-10-24 PROCEDURE — 88305 TISSUE EXAM BY PATHOLOGIST: CPT

## 2023-10-26 ENCOUNTER — LAB REQUISITION (OUTPATIENT)
Dept: LAB | Facility: HOSPITAL | Age: 88
End: 2023-10-26
Payer: MEDICARE

## 2023-10-26 DIAGNOSIS — C44.319 BASAL CELL CARCINOMA OF SKIN OF OTHER PARTS OF FACE: ICD-10-CM

## 2023-10-26 DIAGNOSIS — D48.5 NEOPLASM OF UNCERTAIN BEHAVIOR OF SKIN: ICD-10-CM

## 2023-11-07 PROCEDURE — 88305 TISSUE EXAM BY PATHOLOGIST: CPT | Performed by: DERMATOLOGY

## 2023-11-07 PROCEDURE — 88305 TISSUE EXAM BY PATHOLOGIST: CPT

## 2023-11-08 ENCOUNTER — LAB REQUISITION (OUTPATIENT)
Dept: LAB | Facility: HOSPITAL | Age: 88
End: 2023-11-08
Payer: MEDICARE

## 2023-11-08 DIAGNOSIS — C44.319 BASAL CELL CARCINOMA OF SKIN OF OTHER PARTS OF FACE: ICD-10-CM

## 2024-04-26 ENCOUNTER — APPOINTMENT (OUTPATIENT)
Dept: CT IMAGING | Age: 89
End: 2024-04-26
Payer: MEDICARE

## 2024-04-26 ENCOUNTER — HOSPITAL ENCOUNTER (EMERGENCY)
Age: 89
Discharge: HOME | End: 2024-04-26
Attending: EMERGENCY MEDICINE
Payer: MEDICARE

## 2024-04-26 ENCOUNTER — APPOINTMENT (OUTPATIENT)
Dept: GENERAL RADIOLOGY | Age: 89
End: 2024-04-26
Payer: MEDICARE

## 2024-04-26 VITALS
OXYGEN SATURATION: 96 % | BODY MASS INDEX: 23.54 KG/M2 | DIASTOLIC BLOOD PRESSURE: 68 MMHG | RESPIRATION RATE: 17 BRPM | TEMPERATURE: 98.1 F | HEIGHT: 67 IN | WEIGHT: 150 LBS | HEART RATE: 91 BPM | SYSTOLIC BLOOD PRESSURE: 126 MMHG

## 2024-04-26 DIAGNOSIS — R51.9 NONINTRACTABLE HEADACHE, UNSPECIFIED CHRONICITY PATTERN, UNSPECIFIED HEADACHE TYPE: Primary | ICD-10-CM

## 2024-04-26 LAB
ALBUMIN SERPL-MCNC: 4.5 G/DL (ref 3.5–5.2)
ALP SERPL-CCNC: 129 U/L (ref 40–129)
ALT SERPL-CCNC: 26 U/L (ref 0–40)
ANION GAP SERPL CALCULATED.3IONS-SCNC: 12 MMOL/L (ref 7–16)
AST SERPL-CCNC: 33 U/L (ref 0–39)
BASOPHILS # BLD: 0.05 K/UL (ref 0–0.2)
BASOPHILS NFR BLD: 1 % (ref 0–2)
BILIRUB SERPL-MCNC: 0.5 MG/DL (ref 0–1.2)
BILIRUB UR QL STRIP: NEGATIVE
BUN SERPL-MCNC: 18 MG/DL (ref 6–23)
CALCIUM SERPL-MCNC: 9.8 MG/DL (ref 8.6–10.2)
CHLORIDE SERPL-SCNC: 101 MMOL/L (ref 98–107)
CLARITY UR: CLEAR
CO2 SERPL-SCNC: 25 MMOL/L (ref 22–29)
COLOR UR: YELLOW
COMMENT: ABNORMAL
CREAT SERPL-MCNC: 0.9 MG/DL (ref 0.7–1.2)
EKG ATRIAL RATE: 79 BPM
EKG P AXIS: 40 DEGREES
EKG P-R INTERVAL: 130 MS
EKG Q-T INTERVAL: 380 MS
EKG QRS DURATION: 78 MS
EKG QTC CALCULATION (BAZETT): 435 MS
EKG R AXIS: -21 DEGREES
EKG T AXIS: 34 DEGREES
EKG VENTRICULAR RATE: 79 BPM
EOSINOPHIL # BLD: 0.13 K/UL (ref 0.05–0.5)
EOSINOPHILS RELATIVE PERCENT: 2 % (ref 0–6)
ERYTHROCYTE [DISTWIDTH] IN BLOOD BY AUTOMATED COUNT: 13.3 % (ref 11.5–15)
GFR SERPL CREATININE-BSD FRML MDRD: 81 ML/MIN/1.73M2
GLUCOSE SERPL-MCNC: 101 MG/DL (ref 74–99)
GLUCOSE UR STRIP-MCNC: NEGATIVE MG/DL
HCT VFR BLD AUTO: 44.2 % (ref 37–54)
HGB BLD-MCNC: 14.6 G/DL (ref 12.5–16.5)
HGB UR QL STRIP.AUTO: NEGATIVE
IMM GRANULOCYTES # BLD AUTO: 0.03 K/UL (ref 0–0.58)
IMM GRANULOCYTES NFR BLD: 0 % (ref 0–5)
INR PPP: 1
KETONES UR STRIP-MCNC: NEGATIVE MG/DL
LEUKOCYTE ESTERASE UR QL STRIP: NEGATIVE
LYMPHOCYTES NFR BLD: 1.36 K/UL (ref 1.5–4)
LYMPHOCYTES RELATIVE PERCENT: 16 % (ref 20–42)
MAGNESIUM SERPL-MCNC: 2 MG/DL (ref 1.6–2.6)
MCH RBC QN AUTO: 33 PG (ref 26–35)
MCHC RBC AUTO-ENTMCNC: 33 G/DL (ref 32–34.5)
MCV RBC AUTO: 99.8 FL (ref 80–99.9)
MONOCYTES NFR BLD: 1.24 K/UL (ref 0.1–0.95)
MONOCYTES NFR BLD: 15 % (ref 2–12)
NEUTROPHILS NFR BLD: 66 % (ref 43–80)
NEUTS SEG NFR BLD: 5.53 K/UL (ref 1.8–7.3)
NITRITE UR QL STRIP: NEGATIVE
PH UR STRIP: 6 [PH] (ref 5–9)
PLATELET # BLD AUTO: 199 K/UL (ref 130–450)
PMV BLD AUTO: 9.9 FL (ref 7–12)
POTASSIUM SERPL-SCNC: 4.5 MMOL/L (ref 3.5–5)
PROT SERPL-MCNC: 7.5 G/DL (ref 6.4–8.3)
PROT UR STRIP-MCNC: NEGATIVE MG/DL
PROTHROMBIN TIME: 11.3 SEC (ref 9.3–12.4)
RBC # BLD AUTO: 4.43 M/UL (ref 3.8–5.8)
SODIUM SERPL-SCNC: 138 MMOL/L (ref 132–146)
SP GR UR STRIP: <1.005 (ref 1–1.03)
TROPONIN I SERPL HS-MCNC: 24 NG/L (ref 0–11)
TROPONIN I SERPL HS-MCNC: 27 NG/L (ref 0–11)
UROBILINOGEN UR STRIP-ACNC: 0.2 EU/DL (ref 0–1)
WBC OTHER # BLD: 8.3 K/UL (ref 4.5–11.5)

## 2024-04-26 PROCEDURE — 83735 ASSAY OF MAGNESIUM: CPT

## 2024-04-26 PROCEDURE — 93005 ELECTROCARDIOGRAM TRACING: CPT | Performed by: NURSE PRACTITIONER

## 2024-04-26 PROCEDURE — 80053 COMPREHEN METABOLIC PANEL: CPT

## 2024-04-26 PROCEDURE — 93010 ELECTROCARDIOGRAM REPORT: CPT | Performed by: INTERNAL MEDICINE

## 2024-04-26 PROCEDURE — 84484 ASSAY OF TROPONIN QUANT: CPT

## 2024-04-26 PROCEDURE — 85610 PROTHROMBIN TIME: CPT

## 2024-04-26 PROCEDURE — 71046 X-RAY EXAM CHEST 2 VIEWS: CPT

## 2024-04-26 PROCEDURE — 99285 EMERGENCY DEPT VISIT HI MDM: CPT

## 2024-04-26 PROCEDURE — 85025 COMPLETE CBC W/AUTO DIFF WBC: CPT

## 2024-04-26 PROCEDURE — 81003 URINALYSIS AUTO W/O SCOPE: CPT

## 2024-04-26 PROCEDURE — 70450 CT HEAD/BRAIN W/O DYE: CPT

## 2024-04-26 ASSESSMENT — LIFESTYLE VARIABLES: HOW OFTEN DO YOU HAVE A DRINK CONTAINING ALCOHOL: NEVER

## 2024-04-26 NOTE — ED PROVIDER NOTES
Glucose 101 (*)     All other components within normal limits   URINALYSIS - Abnormal; Notable for the following components:    Specific Gravity, UA <1.005 (*)     All other components within normal limits   TROPONIN - Abnormal; Notable for the following components:    Troponin, High Sensitivity 24 (*)     All other components within normal limits       CT HEAD WO CONTRAST   Final Result   1. No acute intracranial abnormality.   2. Periventricular white matter changes consistent with chronic microvascular   disease.   3. Mild diffuse volume loss.         XR CHEST (2 VW)   Final Result   No acute process.                The nursing notes within the ED encounter have been reviewed by myself       The patient’s available past medical records and past encounters were reviewed.        I have personally reviewed all laboratory radiology results from today's visit.  I have personally reviewed and agree with interpretation of EKG for all EKGs from today's visit.     MDM: Asked to see this patient by RENETTA, required direct physician involvement secondary to complexity of case,         I, Dr. Rodriguez am the primary provider of record    Medical Decision Making  The patient was placed on the cardiac monitor for continuous monitoring of rhythm and vitals. EKG ordered to evaluate patient's current cardiac rate, rhythm, and QT interval. CBC was ordered as part of my assessment for possible infection, anemia or thrombocytopenia CMP to assess electrolytes, kidney function, liver function or any metabolic derangements. Troponin as a marker for myocardial ischemia or heart strain. Chest x-ray for any possible signs of, but without limitation to, pneumonia, pleural effusions, cardiomegaly, pneumothorax, atelectasis, rib or sternal abnormalities including fractures. CT head without contrast to evaluate for hemorrhage or masses.      Amount and/or Complexity of Data Reviewed  Labs: ordered. Decision-making details documented in ED

## 2024-07-25 ENCOUNTER — OFFICE VISIT (OUTPATIENT)
Dept: CARDIOLOGY CLINIC | Age: 89
End: 2024-07-25
Payer: MEDICARE

## 2024-07-25 VITALS
DIASTOLIC BLOOD PRESSURE: 60 MMHG | RESPIRATION RATE: 16 BRPM | HEART RATE: 73 BPM | WEIGHT: 147.9 LBS | SYSTOLIC BLOOD PRESSURE: 118 MMHG | HEIGHT: 67 IN | BODY MASS INDEX: 23.21 KG/M2

## 2024-07-25 DIAGNOSIS — I47.19 ATRIAL TACHYCARDIA (HCC): ICD-10-CM

## 2024-07-25 DIAGNOSIS — I45.6 WPW (WOLFF-PARKINSON-WHITE SYNDROME): ICD-10-CM

## 2024-07-25 DIAGNOSIS — I10 ESSENTIAL HYPERTENSION: Primary | ICD-10-CM

## 2024-07-25 DIAGNOSIS — I34.0 NONRHEUMATIC MITRAL VALVE REGURGITATION: ICD-10-CM

## 2024-07-25 DIAGNOSIS — I36.1 NONRHEUMATIC TRICUSPID VALVE REGURGITATION: ICD-10-CM

## 2024-07-25 PROCEDURE — G8427 DOCREV CUR MEDS BY ELIG CLIN: HCPCS | Performed by: INTERNAL MEDICINE

## 2024-07-25 PROCEDURE — 99214 OFFICE O/P EST MOD 30 MIN: CPT | Performed by: INTERNAL MEDICINE

## 2024-07-25 PROCEDURE — G8420 CALC BMI NORM PARAMETERS: HCPCS | Performed by: INTERNAL MEDICINE

## 2024-07-25 PROCEDURE — 1036F TOBACCO NON-USER: CPT | Performed by: INTERNAL MEDICINE

## 2024-07-25 PROCEDURE — 1123F ACP DISCUSS/DSCN MKR DOCD: CPT | Performed by: INTERNAL MEDICINE

## 2024-07-25 PROCEDURE — 93000 ELECTROCARDIOGRAM COMPLETE: CPT | Performed by: INTERNAL MEDICINE

## 2024-07-25 NOTE — PROGRESS NOTES
Patient Active Problem List   Diagnosis    WPW (Donato-Parkinson-White syndrome) - s/p ablation    Crohn's disease (HCC)    Mitral regurgitation - mild     Tricuspid regurgitation - moderate     Essential hypertension    Chest pain    Atrial tachycardia (HCC)    Lumbar radiculopathy       Current Outpatient Medications   Medication Sig Dispense Refill    bimatoprost (LUMIGAN) 0.01 % SOLN ophthalmic drops Bimatoprost (Lumigan) 0.01 % drops Active 1 DROPS OP Every Day July 20th, 2023 12:00am      acetaminophen (TYLENOL) 500 MG tablet Take 1 tablet by mouth every 6 hours      lisinopril (PRINIVIL;ZESTRIL) 20 MG tablet Take 1 tablet by mouth daily 90 tablet 3    Calcium Carb-Cholecalciferol (CALCIUM CARBONATE-VITAMIN D3 PO) Take 1,200 mg by mouth daily      Multiple Vitamins-Minerals (THERAPEUTIC MULTIVITAMIN-MINERALS) tablet Take 1 tablet by mouth daily      nitroGLYCERIN (NITROSTAT) 0.4 MG SL tablet Place 1 tablet under the tongue every 5 minutes as needed for Chest pain up to max of 3 total doses. If no relief after 1 dose, call 911. 25 tablet 3    Netarsudil Dimesylate 0.02 % SOLN Place 1 drop into the left eye every evening      amLODIPine (NORVASC) 5 MG tablet Take 1 tablet by mouth daily      atorvastatin (LIPITOR) 40 MG tablet Take 0.5 tablets by mouth daily      Lycopene 10 MG CAPS Take 20 mg by mouth daily      brimonidine (ALPHAGAN) 0.2 % ophthalmic solution Place 1 drop into the left eye in the morning and at bedtime      Multiple Vitamins-Minerals (PRESERVISION/LUTEIN) CAPS Take 1 capsule by mouth 2 times daily       vitamin D (CHOLECALCIFEROL) 50 MCG (2000 UT) TABS tablet Take 1 tablet by mouth daily      mesalamine (PENTASA) 250 MG CR capsule Take 6 capsules by mouth 3 times daily       No current facility-administered medications for this visit.       CC:    Patient is seen in follow-up for:  1. Essential hypertension    2. Nonrheumatic mitral valve regurgitation - mild    3. Nonrheumatic tricuspid valve

## 2025-05-06 NOTE — TELEPHONE ENCOUNTER
Patient notified and instructed on echo results

## 2025-07-29 ENCOUNTER — OFFICE VISIT (OUTPATIENT)
Dept: CARDIOLOGY CLINIC | Age: 89
End: 2025-07-29
Payer: MEDICARE

## 2025-07-29 VITALS
HEART RATE: 65 BPM | DIASTOLIC BLOOD PRESSURE: 60 MMHG | HEIGHT: 67 IN | SYSTOLIC BLOOD PRESSURE: 122 MMHG | OXYGEN SATURATION: 97 % | RESPIRATION RATE: 18 BRPM | BODY MASS INDEX: 21.85 KG/M2 | WEIGHT: 139.2 LBS

## 2025-07-29 DIAGNOSIS — I34.0 NONRHEUMATIC MITRAL VALVE REGURGITATION: ICD-10-CM

## 2025-07-29 DIAGNOSIS — I10 ESSENTIAL HYPERTENSION: ICD-10-CM

## 2025-07-29 DIAGNOSIS — I45.6 WPW (WOLFF-PARKINSON-WHITE SYNDROME): ICD-10-CM

## 2025-07-29 DIAGNOSIS — I36.1 NONRHEUMATIC TRICUSPID VALVE REGURGITATION: ICD-10-CM

## 2025-07-29 DIAGNOSIS — I47.19 ATRIAL TACHYCARDIA: ICD-10-CM

## 2025-07-29 DIAGNOSIS — R06.02 SHORTNESS OF BREATH: Primary | ICD-10-CM

## 2025-07-29 PROCEDURE — 1159F MED LIST DOCD IN RCRD: CPT | Performed by: INTERNAL MEDICINE

## 2025-07-29 PROCEDURE — 1036F TOBACCO NON-USER: CPT | Performed by: INTERNAL MEDICINE

## 2025-07-29 PROCEDURE — G8420 CALC BMI NORM PARAMETERS: HCPCS | Performed by: INTERNAL MEDICINE

## 2025-07-29 PROCEDURE — G8427 DOCREV CUR MEDS BY ELIG CLIN: HCPCS | Performed by: INTERNAL MEDICINE

## 2025-07-29 PROCEDURE — 1123F ACP DISCUSS/DSCN MKR DOCD: CPT | Performed by: INTERNAL MEDICINE

## 2025-07-29 PROCEDURE — 99214 OFFICE O/P EST MOD 30 MIN: CPT | Performed by: INTERNAL MEDICINE

## 2025-07-29 PROCEDURE — 93000 ELECTROCARDIOGRAM COMPLETE: CPT | Performed by: INTERNAL MEDICINE

## 2025-07-29 NOTE — PROGRESS NOTES
Disease Mother     Cancer Father         Stomach Cancer       Past Medical History:   Diagnosis Date    Carotid artery occlusion     Chronic pain of left knee     Crohn disease (HCC)     Hyperlipidemia     Hypertension     Mitral regurgitation     Osteopenia     Osteoporosis     Tricuspid regurgitation     WPW (Donato-Parkinson-White syndrome)     had ablation to correct       PHYSICAL EXAM:  CONSTITUTIONAL:  Well developed, well nourished    Vitals:    07/29/25 1120   BP: 122/60   Pulse: 65   Resp: 18   SpO2: 97%   Weight: 63.1 kg (139 lb 3.2 oz)   Height: 1.702 m (5' 7\")     HEAD & FACE: Normocephalic. Symmetric.  EYES: No xanthelasma.  Conjunctivae not injected.  EARS, NOSE, MOUTH & THROAT: Good dentition.  No oral pallor or cyanosis.    NECK: No JVD at 30 degrees.  No thyromegaly.  RESPIRATORY: Clear to auscultation and percussion in all fields.  No use of accessory muscle or intercostal retractions.   CARDIOVASCULAR: Regular rate and rhythm.  No lifts or thrills on palpitation.  Auscultation with normal S1-S2 in intensity and splitting.  Grade 2/6 holosystolic murmur heard best at the apical area without radiation.  No carotid bruits.  Abdominal aorta not enlarged.  Femoral arteries without bruits.  Pedal pulses 2+.  No edema.    ABDOMEN: Soft without hepatic or splenic enlargement.  No tenderness.    MUSCULOSKELETAL: No kyphosis or scoliosis of the back.  Good muscle strength and tone.  No muscle atrophy.  Normal gait and ability to undergo exercise stress testing.  EXTREMITIES: No clubbing or cyanosis.    SKIN: No Xanthomas or ulcerations.  NEUROLOGIC: Oriented to time, place and person.  Normal mood and affect.    LYMPHATIC:  No palpable neck or supraclavicular nodes.  No splenomegaly.     EKG: the EKG tracing was reviewed and found to reveal: Normal sinus rhythm.  Possible left atrial enlargement.  QTc 393 ms.  No change compared to prior tracing.        Assessment & Plan  Shortness of breath   New,

## 2025-08-14 ENCOUNTER — HOSPITAL ENCOUNTER (OUTPATIENT)
Dept: CARDIOLOGY | Age: 89
Discharge: HOME | End: 2025-08-16
Attending: INTERNAL MEDICINE
Payer: MEDICARE

## 2025-08-14 VITALS
DIASTOLIC BLOOD PRESSURE: 60 MMHG | SYSTOLIC BLOOD PRESSURE: 122 MMHG | HEIGHT: 67 IN | WEIGHT: 139 LBS | BODY MASS INDEX: 21.82 KG/M2

## 2025-08-14 DIAGNOSIS — R06.02 SHORTNESS OF BREATH: ICD-10-CM

## 2025-08-14 PROCEDURE — 93306 TTE W/DOPPLER COMPLETE: CPT

## 2025-08-15 ENCOUNTER — RESULTS FOLLOW-UP (OUTPATIENT)
Dept: CARDIOLOGY CLINIC | Age: 89
End: 2025-08-15

## 2025-08-15 LAB
ECHO AO ASC DIAM: 2.9 CM
ECHO AO ASCENDING AORTA INDEX: 1.68 CM/M2
ECHO AV AREA PEAK VELOCITY: 2.6 CM2
ECHO AV AREA VTI: 2.8 CM2
ECHO AV AREA/BSA PEAK VELOCITY: 1.5 CM2/M2
ECHO AV AREA/BSA VTI: 1.6 CM2/M2
ECHO AV CUSP MM: 1.6 CM
ECHO AV MEAN GRADIENT: 3 MMHG
ECHO AV MEAN VELOCITY: 0.8 M/S
ECHO AV PEAK GRADIENT: 6 MMHG
ECHO AV PEAK VELOCITY: 1.2 M/S
ECHO AV VELOCITY RATIO: 0.92
ECHO AV VTI: 24.1 CM
ECHO BSA: 1.73 M2
ECHO EST RA PRESSURE: 3 MMHG
ECHO LA DIAMETER INDEX: 1.97 CM/M2
ECHO LA DIAMETER: 3.4 CM
ECHO LA VOL A-L A2C: 30 ML (ref 18–58)
ECHO LA VOL A-L A4C: 41 ML (ref 18–58)
ECHO LA VOL MOD A2C: 28 ML (ref 18–58)
ECHO LA VOL MOD A4C: 36 ML (ref 18–58)
ECHO LA VOLUME AREA LENGTH: 39 ML
ECHO LA VOLUME INDEX A-L A2C: 17 ML/M2 (ref 16–34)
ECHO LA VOLUME INDEX A-L A4C: 24 ML/M2 (ref 16–34)
ECHO LA VOLUME INDEX AREA LENGTH: 23 ML/M2 (ref 16–34)
ECHO LA VOLUME INDEX MOD A2C: 16 ML/M2 (ref 16–34)
ECHO LA VOLUME INDEX MOD A4C: 21 ML/M2 (ref 16–34)
ECHO LV EF PHYSICIAN: 60 %
ECHO LV EJECTION FRACTION A2C: 55 %
ECHO LV EJECTION FRACTION A4C: 56 %
ECHO LV FRACTIONAL SHORTENING: 28 % (ref 28–44)
ECHO LV INTERNAL DIMENSION DIASTOLE INDEX: 2.31 CM/M2
ECHO LV INTERNAL DIMENSION DIASTOLIC: 4 CM (ref 4.2–5.9)
ECHO LV INTERNAL DIMENSION SYSTOLIC INDEX: 1.68 CM/M2
ECHO LV INTERNAL DIMENSION SYSTOLIC: 2.9 CM
ECHO LV ISOVOLUMETRIC RELAXATION TIME (IVRT): 51.9 MS
ECHO LV IVSD: 1.2 CM (ref 0.6–1)
ECHO LV IVSS: 1.4 CM
ECHO LV MASS 2D: 165.5 G (ref 88–224)
ECHO LV MASS INDEX 2D: 95.6 G/M2 (ref 49–115)
ECHO LV POSTERIOR WALL DIASTOLIC: 1.2 CM (ref 0.6–1)
ECHO LV POSTERIOR WALL SYSTOLIC: 1.5 CM
ECHO LV RELATIVE WALL THICKNESS RATIO: 0.6
ECHO LVOT AREA: 3.1 CM2
ECHO LVOT AV VTI INDEX: 0.95
ECHO LVOT DIAM: 2 CM
ECHO LVOT MEAN GRADIENT: 2 MMHG
ECHO LVOT PEAK GRADIENT: 4 MMHG
ECHO LVOT PEAK VELOCITY: 1.1 M/S
ECHO LVOT STROKE VOLUME INDEX: 41.4 ML/M2
ECHO LVOT SV: 71.6 ML
ECHO LVOT VTI: 22.8 CM
ECHO MV "A" WAVE DURATION: 129.2 MSEC
ECHO MV A VELOCITY: 1.34 M/S
ECHO MV AREA PHT: 2.6 CM2
ECHO MV AREA VTI: 1.5 CM2
ECHO MV E DECELERATION TIME (DT): 241 MS
ECHO MV E VELOCITY: 1.25 M/S
ECHO MV E/A RATIO: 0.93
ECHO MV LVOT VTI INDEX: 2.16
ECHO MV MAX VELOCITY: 1.6 M/S
ECHO MV MEAN GRADIENT: 4 MMHG
ECHO MV MEAN VELOCITY: 0.9 M/S
ECHO MV PEAK GRADIENT: 10 MMHG
ECHO MV PRESSURE HALF TIME (PHT): 83.2 MS
ECHO MV REGURGITANT VELOCITY PISA: 4.8 M/S
ECHO MV REGURGITANT VTIA: 148.5 CM
ECHO MV VTI: 49.2 CM
ECHO PV MAX VELOCITY: 1.4 M/S
ECHO PV MEAN GRADIENT: 3 MMHG
ECHO PV MEAN VELOCITY: 0.8 M/S
ECHO PV PEAK GRADIENT: 7 MMHG
ECHO PV VTI: 25.4 CM
ECHO PVEIN A DURATION: 143 MS
ECHO PVEIN A VELOCITY: 0.3 M/S
ECHO PVEIN PEAK D VELOCITY: 0.7 M/S
ECHO PVEIN PEAK S VELOCITY: 0.7 M/S
ECHO PVEIN S/D RATIO: 1
ECHO RIGHT VENTRICULAR SYSTOLIC PRESSURE (RVSP): 39 MMHG
ECHO RV INTERNAL DIMENSION: 2.6 CM
ECHO TV REGURGITANT MAX VELOCITY: 2.98 M/S
ECHO TV REGURGITANT PEAK GRADIENT: 35 MMHG

## 2025-08-18 DIAGNOSIS — R06.02 SHORTNESS OF BREATH: Primary | ICD-10-CM

## 2025-08-18 RX ORDER — REGADENOSON 0.08 MG/ML
0.4 INJECTION, SOLUTION INTRAVENOUS
OUTPATIENT
Start: 2025-08-18

## 2025-08-19 ENCOUNTER — APPOINTMENT (OUTPATIENT)
Dept: GENERAL RADIOLOGY | Age: 89
End: 2025-08-19
Payer: MEDICARE

## 2025-08-19 ENCOUNTER — HOSPITAL ENCOUNTER (EMERGENCY)
Age: 89
Discharge: HOME | End: 2025-08-20
Attending: STUDENT IN AN ORGANIZED HEALTH CARE EDUCATION/TRAINING PROGRAM
Payer: MEDICARE

## 2025-08-19 ENCOUNTER — APPOINTMENT (OUTPATIENT)
Dept: CT IMAGING | Age: 89
End: 2025-08-19
Payer: MEDICARE

## 2025-08-19 VITALS
SYSTOLIC BLOOD PRESSURE: 132 MMHG | OXYGEN SATURATION: 98 % | HEART RATE: 90 BPM | BODY MASS INDEX: 22.5 KG/M2 | DIASTOLIC BLOOD PRESSURE: 78 MMHG | WEIGHT: 140 LBS | TEMPERATURE: 98.2 F | RESPIRATION RATE: 18 BRPM | HEIGHT: 66 IN

## 2025-08-19 DIAGNOSIS — S40.029A: ICD-10-CM

## 2025-08-19 DIAGNOSIS — S00.03XA HEMATOMA OF SCALP, INITIAL ENCOUNTER: ICD-10-CM
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
## 2025-08-19 DIAGNOSIS — S00.81XA ABRASION OF FACE, INITIAL ENCOUNTER: ICD-10-CM

## 2025-08-19 PROCEDURE — 73090 X-RAY EXAM OF FOREARM: CPT

## 2025-08-19 PROCEDURE — 73110 X-RAY EXAM OF WRIST: CPT

## 2025-08-19 PROCEDURE — 73521 X-RAY EXAM HIPS BI 2 VIEWS: CPT

## 2025-08-19 PROCEDURE — 73080 X-RAY EXAM OF ELBOW: CPT

## 2025-08-19 PROCEDURE — 6370000000 HC RX 637 (ALT 250 FOR IP)

## 2025-08-19 PROCEDURE — 70486 CT MAXILLOFACIAL W/O DYE: CPT

## 2025-08-19 PROCEDURE — 99284 EMERGENCY DEPT VISIT MOD MDM: CPT

## 2025-08-19 PROCEDURE — 71045 X-RAY EXAM CHEST 1 VIEW: CPT

## 2025-08-19 PROCEDURE — 72125 CT NECK SPINE W/O DYE: CPT

## 2025-08-19 PROCEDURE — 70450 CT HEAD/BRAIN W/O DYE: CPT

## 2025-08-19 RX ORDER — ACETAMINOPHEN 500 MG
1000 TABLET ORAL ONCE
Status: COMPLETED | OUTPATIENT
Start: 2025-08-19 | End: 2025-08-19

## 2025-08-19 RX ADMIN — ACETAMINOPHEN 500 MG: 500 TABLET ORAL at 23:18

## 2025-08-19 ASSESSMENT — PAIN SCALES - GENERAL: PAINLEVEL_OUTOF10: 1

## 2025-08-19 ASSESSMENT — PAIN DESCRIPTION - ORIENTATION: ORIENTATION: RIGHT

## 2025-08-19 ASSESSMENT — PAIN DESCRIPTION - LOCATION: LOCATION: ARM

## 2025-08-19 ASSESSMENT — PAIN - FUNCTIONAL ASSESSMENT: PAIN_FUNCTIONAL_ASSESSMENT: 0-10

## 2025-08-20 ENCOUNTER — TELEPHONE (OUTPATIENT)
Dept: CARDIOLOGY | Age: 89
End: 2025-08-20

## 2025-08-20 ASSESSMENT — LIFESTYLE VARIABLES
HOW OFTEN DO YOU HAVE A DRINK CONTAINING ALCOHOL: NEVER
HOW MANY STANDARD DRINKS CONTAINING ALCOHOL DO YOU HAVE ON A TYPICAL DAY: PATIENT DOES NOT DRINK

## (undated) DEVICE — MARKER,SKIN,WI/RULER AND LABELS: Brand: MEDLINE

## (undated) DEVICE — NON-DEHP CATHETER EXTENSION SET, MALE LUER LOCK ADAPTER

## (undated) DEVICE — GAUZE,SPONGE,4"X4",12PLY,STERILE,LF,2'S: Brand: MEDLINE

## (undated) DEVICE — 3M™ RED DOT™ MONITORING ELECTRODE WITH FOAM TAPE AND STICKY GEL 2560, 50/BAG, 20/CASE, 72/PLT: Brand: RED DOT™

## (undated) DEVICE — BANDAGE ADH W1XL3IN NAT FAB WVN FLX DURABLE N ADH PD SEAL

## (undated) DEVICE — NEEDLE HYPO 25GA L1.5IN BLU POLYPR HUB S STL REG BVL STR

## (undated) DEVICE — NEEDLE HYPO 18GA L1.5IN PNK POLYPR HUB S STL THN WALL FILL

## (undated) DEVICE — 3 ML SYRINGE LUER-LOCK TIP: Brand: MONOJECT

## (undated) DEVICE — TOWEL OR BLUEE 16X26IN ST 8 PACK ORB08 16X26ORTWL

## (undated) DEVICE — ENCORE® LATEX TEXTURED SIZE 6.5, STERILE LATEX POWDER-FREE SURGICAL GLOVE: Brand: ENCORE

## (undated) DEVICE — 6 ML SYRINGE LUER-LOCK TIP: Brand: MONOJECT

## (undated) DEVICE — Z DISCONTINUED APPLICATOR SURG PREP 0.35OZ 2% CHG 70% ISO ALC W/ HI LT